# Patient Record
Sex: MALE | Race: WHITE
[De-identification: names, ages, dates, MRNs, and addresses within clinical notes are randomized per-mention and may not be internally consistent; named-entity substitution may affect disease eponyms.]

---

## 2018-04-11 ENCOUNTER — HOSPITAL ENCOUNTER (INPATIENT)
Dept: HOSPITAL 68 - ERH | Age: 83
LOS: 5 days | DRG: 812 | End: 2018-04-16
Admitting: INTERNAL MEDICINE
Payer: COMMERCIAL

## 2018-04-11 VITALS — HEIGHT: 71 IN | WEIGHT: 166 LBS | BODY MASS INDEX: 23.24 KG/M2

## 2018-04-11 DIAGNOSIS — F03.90: ICD-10-CM

## 2018-04-11 DIAGNOSIS — E11.9: ICD-10-CM

## 2018-04-11 DIAGNOSIS — I95.1: ICD-10-CM

## 2018-04-11 DIAGNOSIS — I48.91: ICD-10-CM

## 2018-04-11 DIAGNOSIS — Z85.46: ICD-10-CM

## 2018-04-11 DIAGNOSIS — I45.10: ICD-10-CM

## 2018-04-11 DIAGNOSIS — I10: ICD-10-CM

## 2018-04-11 DIAGNOSIS — E78.5: ICD-10-CM

## 2018-04-11 DIAGNOSIS — N17.9: ICD-10-CM

## 2018-04-11 DIAGNOSIS — Z66: ICD-10-CM

## 2018-04-11 DIAGNOSIS — I47.2: ICD-10-CM

## 2018-04-11 DIAGNOSIS — D64.9: Primary | ICD-10-CM

## 2018-04-11 DIAGNOSIS — E44.1: ICD-10-CM

## 2018-04-11 DIAGNOSIS — N28.1: ICD-10-CM

## 2018-04-11 LAB
ABSOLUTE GRANULOCYTE CT: 6.5 /CUMM (ref 1.4–6.5)
ABSOLUTE GRANULOCYTE CT: 7.9 /CUMM (ref 1.4–6.5)
BASOPHILS # BLD: 0.1 /CUMM (ref 0–0.2)
BASOPHILS # BLD: 0.1 /CUMM (ref 0–0.2)
BASOPHILS NFR BLD: 0.6 % (ref 0–2)
BASOPHILS NFR BLD: 0.7 % (ref 0–2)
EOSINOPHIL # BLD: 0.1 /CUMM (ref 0–0.7)
EOSINOPHIL # BLD: 0.1 /CUMM (ref 0–0.7)
EOSINOPHIL NFR BLD: 0.5 % (ref 0–5)
EOSINOPHIL NFR BLD: 0.6 % (ref 0–5)
ERYTHROCYTE [DISTWIDTH] IN BLOOD BY AUTOMATED COUNT: 23.1 % (ref 11.5–14.5)
ERYTHROCYTE [DISTWIDTH] IN BLOOD BY AUTOMATED COUNT: 23.4 % (ref 11.5–14.5)
GRANULOCYTES NFR BLD: 66.4 % (ref 42.2–75.2)
GRANULOCYTES NFR BLD: 66.7 % (ref 42.2–75.2)
HCT VFR BLD CALC: 23.9 % (ref 42–52)
HCT VFR BLD CALC: 25.1 % (ref 42–52)
LYMPHOCYTES # BLD: 2.5 /CUMM (ref 1.2–3.4)
LYMPHOCYTES # BLD: 3.2 /CUMM (ref 1.2–3.4)
MCH RBC QN AUTO: 30.6 PG (ref 27–31)
MCH RBC QN AUTO: 31 PG (ref 27–31)
MCHC RBC AUTO-ENTMCNC: 32.2 G/DL (ref 33–37)
MCHC RBC AUTO-ENTMCNC: 32.9 G/DL (ref 33–37)
MCV RBC AUTO: 94.2 FL (ref 80–94)
MCV RBC AUTO: 94.9 FL (ref 80–94)
MONOCYTES # BLD: 0.7 /CUMM (ref 0.1–0.6)
MONOCYTES # BLD: 0.7 /CUMM (ref 0.1–0.6)
PLATELET # BLD: 458 /CUMM (ref 130–400)
PLATELET # BLD: 470 /CUMM (ref 130–400)
PMV BLD AUTO: 9 FL (ref 7.4–10.4)
PMV BLD AUTO: 9.3 FL (ref 7.4–10.4)
RED BLOOD CELL CT: 2.52 /CUMM (ref 4.7–6.1)
RED BLOOD CELL CT: 2.67 /CUMM (ref 4.7–6.1)
WBC # BLD AUTO: 11.9 /CUMM (ref 4.8–10.8)
WBC # BLD AUTO: 9.7 /CUMM (ref 4.8–10.8)

## 2018-04-11 NOTE — ED GENERAL ADULT
History of Present Illness
 
General
Chief Complaint: General Adult
Stated Complaint: BIBA ABNORMAL LABS
Source: family, EMS
Exam Limitations: confusion, dementia, poor historian
 
Vital Signs & Intake/Output
Vital Signs & Intake/Output
 Vital Signs
 
 
Date Time Temp Pulse Resp B/P B/P Pulse O2 O2 Flow FiO2
 
     Mean Ox Delivery Rate 
 
04/11 2210 97.9 78 16 122/65  98 Room Air  
 
04/11 1856 97.2 73 16 119/58  99 Room Air  
 
04/11 1532 96.2 61 18 106/56  100 Room Air  
 
 
 ED Intake and Output
 
 
 04/12 0000 04/11 1200
 
Intake Total  
 
Output Total  
 
Balance  
 
   
 
Patient 177 lb 
 
Weight  
 
Weight Reported by Patient 
 
Measurement  
 
Method  
 
 
 
Allergies
Coded Allergies:
NO KNOWN ALLERGIES (01/29/16)
 
Triage Note:
PT BROUGHT TO ED BY AMBULANCE FROM [facility]
 FACILITY, DEMENTIA UNIT FOR H/H 6.8/21.8. PT
 OFFERS NO COMPLAINTS
Triage Nurses Notes Reviewed? yes
Onset: Abrupt
Duration: day(s):
Timing: recent history
HPI:
 
 
 
4/11/18
83-year-old man brought into the emergency department for agitation and abnormal
labs.  According to the family and I spoke both to the wife and the daughter, 
the patient has a history of dementia.  He is usually reasonably controlled and 
cheerful on his meds.  He had outpatient lab work that revealed anemia.  They 
also noted that he has not been taking his medications and has been agitated.  
In the ED he is threatening.  He states that he wants to die.  He is oriented 
3.  He refused IV fluids, rectal exam or further evaluation.  IM Haldol was 
ordered.
(Parker Singleton DO)
Reconcile Medications
Amlodipine Besylate 10 MG TABLET   1 TAB PO DAILY HEART  (Reported)
Aspirin (Ecotrin*) 81 MG TABLET.DR   1 TAB PO DAILY HEART  (Reported)
Cholecalciferol (Vitamin D3) (Vitamin D) 1,000 UNIT TABLET   1 TAB PO DAILY SUP 
(Reported)
Citalopram Hydrobromide (Celexa) 10 MG TABLET   0.5 TAB PO DAILY MENTLA  (
Reported)
Cyanocobalamin (Vitamin B-12) 1,000 MCG TABLET   1 TAB PO DAILY SUP  (Reported)
Divalproex Sodium (Depakote) 125 MG TABLET.DR   1 TAB PO TID MOOD  (Reported)
Donepezil HCl 10 MG TABLET   1 TAB PO DAILY DEMENTIA  (Reported)
Ramelteon (Rozerem) 8 MG TABLET   1 TAB PO AT BEDTIME PRN INSONIA
Simvastatin (Zocor*) 40 MG TABLET   1 TAB PO QPM hlp  (Reported)
Trazodone HCl 50 MG TABLET   0.5 TAB PO Q8 PRN AGITATION/AGGRESSIVE BEHAVIOIU
 
(Barbie TOSCANO,Pablo BELTRAN)
 
Past History
 
Travel History
Traveled to [place] past 21 day No
 
Medical History
Any Pertinent Medical History? see below for history
Neurological: dementia
EENT: NONE
Cardiovascular: hypertension, HYPERLIPIDEMIA
Respiratory: NONE
Gastrointestinal: NONE
Hepatic: NONE
Renal: NONE
Musculoskeletal: NONE
Psychiatric: NONE
Endocrine: NIDDM
Blood Disorders: NONE, anemia
Cancer(s): NONE
GYN/Reproductive: NONE
History of MRSA: No
History of VRE: No
History of CDIFF: No
 
Surgical History
Surgical History: non-contributory
 
Psychosocial History
Who do you live with Family
Services at Home Home Health Aide
What is your primary language English
Tobacco Use: Never used
Daily Tobacco Use Amount/Type: =< 4 Cigarettes daily
ETOH Use: denies use
Illicit Drug Use: denies illicit drug use
 
Family History
Family History, If Any:
Relation not specified for:
  *No pertinent family history
 
Hx Contributory? No
(Parker Singleton DO)
 
Review of Systems
 
Review of Systems
Constitutional:
Denies: fever. 
EENTM:
Reports: no symptoms. 
Respiratory:
Denies: short of breath. 
Cardiovascular:
Denies: chest pain. 
GI:
Denies: abdominal pain. 
Genitourinary:
Reports: no symptoms. 
Musculoskeletal:
Reports: no symptoms. 
Skin:
Reports: no symptoms. 
Neurological/Psychological:
Reports: confusion, dementia. 
Hematologic/Endocrine:
Reports: no symptoms. 
Immunologic/Allergic:
Reports: no symptoms. 
(Parker Singleton DO)
 
Physical Exam
 
Physical Exam
General Appearance: awake, anxious, moderate distress
Head: atraumatic, normal appearance
Eyes:
Bilateral: normal appearance, PERRL, EOMI. 
Ears, Nose, Throat: normal pharynx, normal ENT inspection
Neck: normal inspection, supple
Respiratory: normal breath sounds, chest non-tender, no respiratory distress
Cardiovascular: regular rate/rhythm
Peripheral Pulses:
4+ radial (R), 4+ radial (L)
Gastrointestinal: non-tender
Back: normal inspection
Extremities: no edema
Neurologic/Psych: awake, alert, oriented x 3
Skin: intact, normal color, warm/dry
 
Core Measures
ACS in differential dx? No
CVA/TIA Diagnosis: No
Sepsis Present: No
Sepsis Focused Exam Completed? No
(Mani ARRIAGA,Parker MARSHALL)
 
Physical Exam
Rectal: guaiac negative
(Barbie TOSCANO,Pablo BELTRAN)
 
Progress
Differential Diagnoses
I considered the following diagnoses in my evaluation of the patient: [Dementia,
agitation, GI bleed, UTI, electrolyte derangement, suicidal ideation, depression
]
 
Plan of Care:
 Orders
 
 
Procedure Date/time Status
 
Heart Healthy Diet 04/12 B Active
 
MISTAKE 04/12 0600 Active
 
PARTIAL THROMBOPLASTIN TIME 04/12 0600 Active
 
PROTHROMBIN TIME 04/12 0600 Active
 
CBC WITHOUT DIFFERENTIAL 04/12 0600 Active
 
BASIC ELECTROLYTES PLUS BUN&CR 04/12 0600 Active
 
Lab Add-on Test 04/12  UNK Active
 
Hemoccult 04/12  UNK Active
 
EKG 04/11 2339 Active
 
EKG 04/11 2310 Active
 
Lab Add-on Test 04/11 2302 Active
 
Pathway - chart 04/11 2300 Active
 
House Staff 04/11 2300 Active
 
Patient Data 04/11 2246 Active
 
Saline Lock 04/11 2237 Active
 
Misc Message 04/11 2237 Active
 
ED Holding Orders 04/11 2237 Active
 
Admit to inpatient 04/11 2237 Active
 
Vital Signs 04/11 2237 Active
 
Code Status 04/11 2237 Active
 
EKG 04/11 2218 Active
 
RETICULOCYTE COUNT 04/11 2133 Active
 
CBC WITHOUT DIFFERENTIAL 04/11 2130 Active
 
TSH REFLEX 04/11 1629 Active
 
TOTAL IRON BINDING CAPACITY 04/11 1629 Active
 
PRE-ALBUMIN 04/11 1629 Active
 
LDH (LACT ACID DEHYDROGENASE) 04/11 1629 Active
 
FOLIC ACID 04/11 1629 Active
 
FERRITIN 04/11 1629 Active
 
SERUM IRON 04/11 1629 Active
 
VITAMIN B12 04/11 1629 Active
 
Saline Lock 04/11 1617 Active
 
COMPREHENSIVE METABOLIC PANEL 04/11 1617 Active
 
CBC WITHOUT DIFFERENTIAL 04/11 1617 Complete
 
Intake & Output 04/11 1545 Active
 
VTE Mechanical Prophylaxis 04/11  UNK Active
 
 
 Current Medications
 
 
  Sig/Tanesha Start time  Last
 
Medication Dose  Stop Time Status Admin
 
Atorvastatin Calcium 20 MG 1700 04/12 1700 UNVr 
 
(Lipitor)     
 
Divalproex Sodium 125 MG TID 04/12 1000 AC 
 
(Depakote)     
 
Cholecalciferol 1,000 IU 0900 04/12 0900 AC 
 
(Vitamin D)     
 
Cyanocobalamin 1,000 MCG 0900 04/12 0900 AC 
 
(Vitamin B12)     
 
Donepezil HCl 10 MG 0900 04/12 0900 AC 
 
(Aricept)     
 
Ramelteon 8 MG AT BEDTIME PRN 04/12 0145 UNVr 
 
(Rozerem)     
 
Trazodone HCl 25 MG Q8 PRN 04/11 2345 AC 
 
(Desyrel)     
 
Acetaminophen 650 MG Q6P PRN 04/11 2300 AC 
 
(Tylenol)     
 
 
 Laboratory Tests
 
 
 
04/11/18 2133:
CBC w Diff NO MAN DIFF REQ, RBC 2.52  L, MCV 94.9  H, MCH 30.6, MCHC 32.2  L, 
RDW 23.4  H, MPV 9.0, Gran % 66.7, Lymphocytes % 25.5, Monocytes % 6.7, 
Eosinophils % 0.5, Basophils % 0.6, Absolute Granulocytes 6.5, Absolute 
Lymphocytes 2.5, Absolute Monocytes 0.7  H, Absolute Eosinophils 0.1, Absolute 
Basophils 0.1, Retic Count Pending
 
04/11/18 1629:
Anion Gap 14, Estimated GFR 53  L, BUN/Creatinine Ratio 22.3, Glucose 115  H, 
Calcium 9.1, Iron 54, TIBC 357, Ferritin 223.0, Total Bilirubin 0.4, AST 16  L, 
ALT 20  L, Alkaline Phosphatase 64, Lactate Dehydrogenase 431, Total Protein 6.0
 L, Albumin 3.6, Globulin 2.4, Albumin/Globulin Ratio 1.5, Prealbumin Pending, 
Vitamin B12 > 1000  H, Folate > 20.0  H, TSH &T3 &Free T4 Intrp Pending, CBC w 
Diff NO MAN DIFF REQ, RBC 2.67  L, MCV 94.2  H, MCH 31.0, MCHC 32.9  L, RDW 23.1
 H, MPV 9.3, Gran % 66.4, Lymphocytes % 26.5, Monocytes % 5.8, Eosinophils % 0.6
, Basophils % 0.7, Absolute Granulocytes 7.9  H, Absolute Lymphocytes 3.2, 
Absolute Monocytes 0.7  H, Absolute Eosinophils 0.1, Absolute Basophils 0.1
 
Initial ED EKG: none
(Parker Singleton DO)
Differential Diagnoses
I considered the following diagnoses in my evaluation of the patient: anemia, gi
bleed vs other. 
 
Initial ED EKG: rbbb, afibx2
(Barbie TOSCANO,Pablo BELTRAN)
 
Departure
 
Departure
Disposition: STILL A PATIENT
Condition: Stable
Clinical Impression
Primary Impression: Agitation
Secondary Impressions: Anemia, Azotemia, Suicidal ideation
Referrals:
Saima Mixon MD
 
Departure Forms:
Customer Survey
General Discharge Information
Comments
 
 
 
 
 
4/11/18
The patient was signed out to Dr. Valentin at 7 PM.
(Parker Singleton DO)
 
Departure
Prescriptions:
Current Visit Scripts
Ramelteon (Rozerem) 1 TAB PO AT BEDTIME PRN INSONIA 
     #2 TAB 
 
 
Comments
4/11/18, 20:03... pt is guiac negative on exam, otherwise benign.... Labs from 
[facility] reveal hgb 6.8.  Here, hgb is 8.5... will repeat.  will check orthostatics. 
if stable, pt safe for discharge for outpatient workup. 
 
Admission Note
Spoke With:
Lance Brady MD
Documentation of Exam:
Documentation of any treatments & extenuating circumstances including Concerns 
Regarding Discharge (functional status, medication knowledge or non-compliance, 
living conditions, etc.) that warrant an admission rather than observation: 
 
hgb 8.3 -->7.7.... was 6.8 at [facility]... pt merits serial cbc, consider endocscopy 
if cbc continues to fall. 
 
(Barbie TOSCANO,Pablo BELTRAN)
 
Critical Care Note
 
Critical Care Note
Critical Care Time: 30-74 min
(Parker Singleton DO)

## 2018-04-11 NOTE — HISTORY & PHYSICAL
Arvin Beckwith 04/11/18 2253:
General Information and HPI
History of Present Illness:
Mr. Noguera is a 82 yo m with a PMH HTN, HLD, dementia SIB [facility] for low H&H.  Patient poor historian.  According to [facility] facility, 3 weeks ago patient had a viral gastroenteritis that eventually
resolved.  Subsequently patient had a poor appetite and has not been eating his 
favorite foods when offered to him.  According to the nurse patient has lost 
approximately 20 pounds in months which prompted the PCP to order routine labs 
which revealed changes his low H&H.  According to patient at baseline he walks 
without assistance.  Patient has not had any nausea, vomiting, fever, chills, 
night sweats, abdominal pain or urinary symptoms.
 
Allergies/Medications
Allergies:
Coded Allergies:
NO KNOWN ALLERGIES (01/29/16)
 
 
Past History
 
Travel History
Traveled to [place] past 21 day No
 
Medical History
Neurological: dementia
EENT: NONE
Cardiovascular: hypertension, HYPERLIPIDEMIA
Respiratory: NONE
Gastrointestinal: NONE
Hepatic: NONE
Renal: NONE
Musculoskeletal: NONE
Psychiatric: NONE
Endocrine: NIDDM
Blood Disorders: NONE, anemia
Cancer(s): NONE
GYN/Reproductive: NONE
History of MRSA: No
History of VRE: No
History of CDIFF: No
 
Surgical History
Surgical History: non-contributory
 
Past Family/Social History
 
Family History
Relations & Conditions if any
Relation not specified for:
  *No pertinent family history
 
 
Psychosocial History
Who Do You Live With? spouse
Services at Home: Home Health Aide
Primary Language: English
ETOH Use: denies use
Illicit Drug Use: denies illicit drug use
 
Functional Ability
ADLs
Independent: dressing, eating, toileting, bathing. 
Ambulation: independent
IADLs
Independent: shopping, housework, finances, food prep, telephone, transportation
, medication admin. 
 
Review of Systems
 
Review of Systems
Constitutional:
Reports: see HPI. 
 
Exam & Diagnostic Data
Last 24 Hrs of Vital Signs/I&O
 Vital Signs
 
 
Date Time Temp Pulse Resp B/P B/P Pulse O2 O2 Flow FiO2
 
     Mean Ox Delivery Rate 
 
04/11 2210 97.9 78 16 122/65  98 Room Air  
 
04/11 1856 97.2 73 16 119/58  99 Room Air  
 
04/11 1532 96.2 61 18 106/56  100 Room Air  
 
 
 Intake & Output
 
 
 04/12 0800 04/12 0000 04/11 1600
 
Intake Total   
 
Output Total   
 
Balance   
 
    
 
Patient   177 lb
 
Weight   
 
Weight   Reported by Patient
 
Measurement   
 
Method   
 
 
 
 
Physical Exam
General Appearance Alert, Cooperative, No Acute Distress, Oriented to person and
place
Cardiovascular Regular Rate, Normal S1, Normal S2, No Murmurs
Lungs Clear to Auscultation, Normal Air Movement
Abdomen Normal Bowel Sounds, Soft, No Tenderness
Extremities No Edema
Last 24 Hrs of Labs/Jesse:
 Laboratory Tests
 
04/11/18 2133:
CBC w Diff NO MAN DIFF REQ, RBC 2.52  L, MCV 94.9  H, MCH 30.6, MCHC 32.2  L, 
RDW 23.4  H, MPV 9.0, Gran % 66.7, Lymphocytes % 25.5, Monocytes % 6.7, 
Eosinophils % 0.5, Basophils % 0.6, Absolute Granulocytes 6.5, Absolute 
Lymphocytes 2.5, Absolute Monocytes 0.7  H, Absolute Eosinophils 0.1, Absolute 
Basophils 0.1
 
04/11/18 1629:
Anion Gap 14, Estimated GFR 53  L, BUN/Creatinine Ratio 22.3, Glucose 115  H, 
Calcium 9.1, Iron 54, TIBC 357, Ferritin 223.0, Total Bilirubin 0.4, AST 16  L, 
ALT 20  L, Alkaline Phosphatase 64, Lactate Dehydrogenase 431, Total Protein 6.0
 L, Albumin 3.6, Globulin 2.4, Albumin/Globulin Ratio 1.5, Vitamin B12 Pending, 
Folate Pending, CBC w Diff NO MAN DIFF REQ, RBC 2.67  L, MCV 94.2  H, MCH 31.0, 
MCHC 32.9  L, RDW 23.1  H, MPV 9.3, Gran % 66.4, Lymphocytes % 26.5, Monocytes %
5.8, Eosinophils % 0.6, Basophils % 0.7, Absolute Granulocytes 7.9  H, Absolute 
Lymphocytes 3.2, Absolute Monocytes 0.7  H, Absolute Eosinophils 0.1, Absolute 
Basophils 0.1
 
 
Diagnostic Data
EKG Results
RBBB, HR 69, QTc 502
 
Assessment/Plan
Assessment:
Mr. Noguera is a 82 yo m with a PMH HTN, HLD, dementia SIB [facility] by [facility] for low H&H with recent weight loss.  His cardiologist is Dr. Wang Graf.  In the ED he was found to be orthostatic positive
 
Problem list:
Anemia
SCOTT
? New onset AFIB
 
Plan:
Admit to general med for further evaluation and management
Monitor H&H, goal hgb >7, consider transfusion if necessary
Anemia workup: iron studies, folate, B12, retic ct
Consider gentle hydration if creatinine function worsens
Continue home meds except antihypertensives given his current blood pressure and
citalopram due to QTc prolongation
Repeat orthostats
Consider GI consult if signs of bleeding
Cardio consult for ? afib
DVT ppx: ALPS
 
Code: DNR/I
 
As Ranked By This Provider
Problem List:
 1. Anemia
 
 2. SCOTT (acute kidney injury)
 
 
Core Measures/Misc (9/17)
 
Acute Coronary Syndrome
ACS Diagnosis: No
 
Congestive Heart Failure
Congestive Heart Failure Diagnosis No
 
Cerebrovascular Accident
CVA/TIA Diagnosis: No
 
VTE (View Protocol)
VTE Risk Factors Age>40
No Mechanical VTE Prophylaxis d/t N/A MechProphylax Ordered
No VTE Pharm Prophylaxis d/t Medical Contraindication
 
Sepsis (View protocol)
Sepsis Present: No
 
Daniel Zimmerman 04/12/18 0147:
General Information and HPI
 
Allergies/Medications
Home Med list
Amlodipine Besylate 10 MG TABLET   1 TAB PO DAILY HEART  (Reported)
Aspirin (Ecotrin*) 81 MG TABLET.DR   1 TAB PO DAILY HEART  (Reported)
Cholecalciferol (Vitamin D3) (Vitamin D) 1,000 UNIT TABLET   1 TAB PO DAILY SUP 
(Reported)
Citalopram Hydrobromide (Celexa) 10 MG TABLET   0.5 TAB PO DAILY MENTLA  (
Reported)
Cyanocobalamin (Vitamin B-12) 1,000 MCG TABLET   1 TAB PO DAILY SUP  (Reported)
Divalproex Sodium (Depakote) 125 MG TABLET.DR   1 TAB PO TID MOOD  (Reported)
Donepezil HCl 10 MG TABLET   1 TAB PO DAILY DEMENTIA  (Reported)
Ramelteon (Rozerem) 8 MG TABLET   1 TAB PO AT BEDTIME PRN INSONIA
Simvastatin (Zocor*) 40 MG TABLET   1 TAB PO QPM hlp  (Reported)
Trazodone HCl 50 MG TABLET   0.5 TAB PO Q8 PRN AGITATION/AGGRESSIVE BEHAVIOIU
 
 
 
Resident Review Statement
Resident Statement: examined this patient, discussed with intern, reviewed 
images, amended to note
Other Findings:
82-year-old gentleman who has history of dementia, hypertension, organic 
affective disorder, CKD,HLP
Who has been living at dementia unit at [facility] in senior housing was brought 
to the ED because of abnormal labs especially low hemoglobin.
 
Patient has dementia however he is oriented to place and time but not the dates 
and season.  He denies any chief complaint of chest pain, nausea, vomiting, 
fevers, chills, weakness, changes any urinary or bowel habits.  He also denies 
that he is taking any medication or has any past medical history.
Information was obtained from patient's power of , his wife Brooke and 
the nurse at the facility.
Apparently patient had episode of gastroenteritis 2 weeks ago and after that he 
had very low appetite.  The wife also reports that patient has lost 15 pounds 
with unknown duration of time And has fatigue as well.  Patient PCP Dr. Hardwick 
ordered some blood work showed hemoglobin of 6.7+ he was referred to ED for 
further management.
 
Last hemoglobin in October 16 was 13.3.  Patient's blood work was done twice in 
the ED with hemoglobin of 8.3 an patient had positive orthostatic vital signs in
the ED as well.
 
Vital signs in ED upon visiting was stable, no fever
 
Patient is alert and oriented 2
 
Skin No Rashes, No Breakdown, No Significant Lesion
HEENT Atraumatic, PERRL,
Cardiovascular Regular Rate, Normal S1, Normal S2
Lungs Clear to Auscultation, Normal Air Movement
Abdomen Normal Bowel Sounds, Soft, No Tenderness, No Hepatospenomegaly, No 
Masses
Extremities bilateral ankle edema
 
 platelet count 458, hemoglobin 7.7, creatinine 1.3 which is the baseline,low 
ast alt, hco3 21
 
EKG showed possible atrial fibrillation in some leads however wwe will repeat 
the EKG, no acute ST-T changes, RBBB, Qtc 500
 
Stool guaiac was negative
 
Assessment
Macrocytic anemia
Orthostatic hypotension, 
history of dementia on valproic acid and Aricept, 
history of organic behavior disorder
History of hyperlipidemia
History of hypertension
 
Plan
Admit to general medicine floor
Check CBC in the morning
Patient can eat for now
Hold amlodipine for now and check orthostatic vital signs in the morning
Check B12, folate, iron studies,LDH,retic count (outpatient labs retic count was
very low)
GI consultation in the morning if hg drops further
Hold aspirin, no subcutaneous heparin for pharmacological DVT prophylaxis
Repeat EKG again to rule out atrial fibrillation
Hold Celexa(prologned qtc)
c/w trazodone, valproic acid,aricept
Watch for bleeding
Continue Zocor
watch for sundowning
Stool guaiacs
 
 
DNR/DNI, regular diet, Tylenol for pain, DVT prophylaxis is mechanical
 
 
Lance Brady 04/12/18 0539:
Attending MD Review Statement
 
Attending Statement
Attending MD Statement: examined this patient, discuss w/resident/PA/NP, agreed 
w/resident/PA/NP, reviewed EMR data (avail), reviewed images, amended to note
Attending Assessment/Plan:
 
CC: Low hemoglobin
PMH: Hypertension, dyslipidemia, diabetes mellitus, left lower extremity 
weakness, prostate cancer, dementia
 
Patient does not provide any history, because of dementia. History is mostly 
obtained from calling ECF and patient's family. According to them Asian had 
gastroenteritis approximately 3 weeks back, since then his appetite has been low
and he lost 20 pounds weight in last 1 month so routine labs were obtained where
his hemoglobin was found to be low which is 6.6 so he was sent to ER. Patient 
does not endorse any complaints, not much oriented. Family not bedside.
 
Vitals: Afebrile, pulse 61, RR 18, blood pressure 106/56, saturating 100% on 
room air 
On exam: A NOT ORIENTED, cooperative, no acute distress, neck supple, JVD normal
, no lymphadenopathy, mucosa moist, no focal neurological deficit, no dependent 
edema, no obvious skin rashes or inflammation CVS: S1-S2, RRR. RS: Clear to 
auscultate bilaterally. Abdomen: Soft, NT, ND, bowel sounds present.
 
Assessment and plan
 
83 year old male with above-mentioned past medical history was sent to ER from 
[facility] for low H&H. It was routinely checked for his decreased appetite and 20 
pound weight loss over last 1 month. Nursing home has been giving different food
to him according to his liking but patient has not been eating much. No obvious 
blood loss noted. Rectal examination done in ER was guaiac negative but 
orthostatic vitals were checked and lying down and sitting position his blood 
pressure dropped. ER suggested admission for low H&H. Even though his hemoglobin
was 6.6 at the facility repeat hemoglobin was 8.3 when he came to ER. Repeated 5
hours after first ER draw, hemoglobin 7.7. It's unclear significance, no acute 
bleeding noted. Probably anemia appears nutritional. Change in orthostatics 
could be autonomic dysfunction related to his dementia.
ECG shows questionable A. fib versus artifact, no history of A. fib in the past,
will place him on telemetry and obtain cardiology opinion
 
+ Anemia
+ ? new onset afib 
+ History of Hypertension, dyslipidemia, diabetes mellitus, left lower extremity
weakness, prostate cancer, dementia
 
 
- Admit to telemetry 
- Check iron, TIBC, ferritin, reticulocyte count, folic acid, B12, prealbumin
- Nutritional consult in a.m.
- Patient may require outpatient GI follow-up
- Repeat CBC BMP in a.m.
- No DVT prophylaxis
- Continue all his home medications except antihypertensives
- Cardiology consult in a.m.

## 2018-04-12 VITALS — SYSTOLIC BLOOD PRESSURE: 112 MMHG | DIASTOLIC BLOOD PRESSURE: 59 MMHG

## 2018-04-12 VITALS — DIASTOLIC BLOOD PRESSURE: 56 MMHG | SYSTOLIC BLOOD PRESSURE: 100 MMHG

## 2018-04-12 VITALS — DIASTOLIC BLOOD PRESSURE: 54 MMHG | SYSTOLIC BLOOD PRESSURE: 98 MMHG

## 2018-04-12 LAB
ABSOLUTE GRANULOCYTE CT: 4.5 /CUMM (ref 1.4–6.5)
ABSOLUTE GRANULOCYTE CT: 5.9 /CUMM (ref 1.4–6.5)
APTT BLD: 27 SEC (ref 25–37)
BASOPHILS # BLD: 0 /CUMM (ref 0–0.2)
BASOPHILS # BLD: 0.1 /CUMM (ref 0–0.2)
BASOPHILS NFR BLD: 0.5 % (ref 0–2)
BASOPHILS NFR BLD: 0.6 % (ref 0–2)
EOSINOPHIL # BLD: 0 /CUMM (ref 0–0.7)
EOSINOPHIL # BLD: 0.1 /CUMM (ref 0–0.7)
EOSINOPHIL NFR BLD: 0.5 % (ref 0–5)
EOSINOPHIL NFR BLD: 1.3 % (ref 0–5)
ERYTHROCYTE [DISTWIDTH] IN BLOOD BY AUTOMATED COUNT: 21.8 % (ref 11.5–14.5)
ERYTHROCYTE [DISTWIDTH] IN BLOOD BY AUTOMATED COUNT: 23.6 % (ref 11.5–14.5)
GRANULOCYTES NFR BLD: 58.5 % (ref 42.2–75.2)
GRANULOCYTES NFR BLD: 64.8 % (ref 42.2–75.2)
HCT VFR BLD CALC: 22.9 % (ref 42–52)
HCT VFR BLD CALC: 24.3 % (ref 42–52)
LYMPHOCYTES # BLD: 2.5 /CUMM (ref 1.2–3.4)
LYMPHOCYTES # BLD: 2.5 /CUMM (ref 1.2–3.4)
MCH RBC QN AUTO: 30.3 PG (ref 27–31)
MCH RBC QN AUTO: 30.4 PG (ref 27–31)
MCHC RBC AUTO-ENTMCNC: 32 G/DL (ref 33–37)
MCHC RBC AUTO-ENTMCNC: 32.2 G/DL (ref 33–37)
MCV RBC AUTO: 94 FL (ref 80–94)
MCV RBC AUTO: 94.9 FL (ref 80–94)
MONOCYTES # BLD: 0.5 /CUMM (ref 0.1–0.6)
MONOCYTES # BLD: 0.6 /CUMM (ref 0.1–0.6)
PLATELET # BLD: 425 /CUMM (ref 130–400)
PLATELET # BLD: 442 /CUMM (ref 130–400)
PMV BLD AUTO: 10 FL (ref 7.4–10.4)
PMV BLD AUTO: 10 FL (ref 7.4–10.4)
PROTHROMBIN TIME: 10.8 SEC (ref 9.4–12.5)
RED BLOOD CELL CT: 2.43 /CUMM (ref 4.7–6.1)
RED BLOOD CELL CT: 2.56 /CUMM (ref 4.7–6.1)
WBC # BLD AUTO: 7.7 /CUMM (ref 4.8–10.8)
WBC # BLD AUTO: 9.1 /CUMM (ref 4.8–10.8)

## 2018-04-12 NOTE — ADMISSION CERTIFICATION
Admission Certification
 
Certification Statement
- As attending physician, I certify that at the time of
- admission, based on clinical presentation, severity of
- symptoms, need for further diagnostic testing and
- therapeutic interventions, and risk of adverse outcomes
- without in-hospital treatment, in my clinical assessment,
- this patient requires an acute hospital stay for a minimum
- of two nights or longer. I have also considered psychsocial
- factors such as support system, advanced age, financial
- issues, cognitive issues, and failed out-patient treatments,
- past re-admission history, safety of patient, and lack of
- compliance as applicable.
Specific rationale supporting this admission is:
Anemia

## 2018-04-12 NOTE — CONS- CARDIOLOGY
General Information and HPI
 
Consulting Request
Date of Consult: 04/12/18
Requested By:
Lance Brady MD
 
Reason for Consult:
Anemia/ Atrial Fib
Source of Information: patient, old records
Exam Limitations: patient's age, dementia, poor historian
History of Present Illness:
History of Present Illness:
Mr. Noguera is a 82 yo m with a PMH HTN, HLD, dementia SIB Benchmark by [facility] for low H&H.  Patient poor historian.  According to [facility], 3 weeks ago patient had a viral gastroenteritis that eventually
resolved.  Subsequently patient had a poor appetite and has not been eating his 
favorite foods when offered to him.  According to the nurse patient has lost 
approximately 20 pounds in months which prompted the PCP to order routine labs 
which revealed changes his low H&H.  According to patient at baseline he walks 
without assistance.  Patient has not had any nausea, vomiting, fever, chills, 
night sweats, abdominal pain or urinary symptoms.
 
 
Allergies/Medications
Allergies:
Coded Allergies:
NO KNOWN ALLERGIES (01/29/16)
 
Home Med List:
Amlodipine Besylate 10 MG TABLET   1 TAB PO DAILY HEART  (Reported)
Aspirin (Ecotrin*) 81 MG TABLET.DR   1 TAB PO DAILY HEART  (Reported)
Cholecalciferol (Vitamin D3) (Vitamin D) 1,000 UNIT TABLET   1 TAB PO DAILY SUP 
(Reported)
Citalopram Hydrobromide (Celexa) 10 MG TABLET   0.5 TAB PO DAILY MENTLA  (
Reported)
Cyanocobalamin (Vitamin B-12) 1,000 MCG TABLET   1 TAB PO DAILY SUP  (Reported)
Divalproex Sodium (Depakote) 125 MG TABLET.DR   1 TAB PO TID MOOD  (Reported)
Donepezil HCl 10 MG TABLET   1 TAB PO DAILY DEMENTIA  (Reported)
Ramelteon (Rozerem) 8 MG TABLET   1 TAB PO AT BEDTIME PRN INSONIA
Simvastatin (Zocor*) 40 MG TABLET   1 TAB PO QPM hlp  (Reported)
Trazodone HCl 50 MG TABLET   0.5 TAB PO Q8 PRN AGITATION/AGGRESSIVE BEHAVIOIU
 
Current Medications:
 Current Medications
 
 
  Sig/Tanesha Start time  Last
 
Medication Dose Route Stop Time Status Admin
 
Acetaminophen 650 MG Q6P PRN 04/11 2300 AC 
 
  PO   
 
Atorvastatin Calcium 20 MG 1700 04/12 1700 AC 
 
  PO   
 
Cholecalciferol 1,000 IU 0900 04/12 0900 AC 04/12
 
  PO   0957
 
Cyanocobalamin 1,000 MCG 0900 04/12 0900 AC 04/12
 
  PO   0957
 
Divalproex Sodium 125 MG TID 04/12 1000 DC 
 
  PO   
 
Divalproex Sodium 125 MG TID 04/12 0900 AC 04/12
 
  PO   0957
 
Donepezil HCl 10 MG 0900 04/12 0900 AC 04/12
 
  PO   0957
 
Haloperidol 0 .STK-MED ONE 04/11 1901 DC 
 
  .ROUTE   
 
Haloperidol 5 MG ONCE ONE 04/11 1900 DC 04/11
 
  IM 04/11 1901  1858
 
Ramelteon 8 MG AT BEDTIME PRN 04/12 0145 AC 
 
  PO   
 
Trazodone HCl 25 MG Q8 PRN 04/11 2345 AC 
 
  PO   
 
 
 
 
Past History
 
Travel History
Traveled to [place] past 21 day No
 
Medical History
Neurological: dementia
EENT: NONE
Cardiovascular: hypertension, HYPERLIPIDEMIA
Respiratory: NONE
Gastrointestinal: NONE
Hepatic: NONE
Renal: NONE
Musculoskeletal: NONE
Psychiatric: NONE
Endocrine: NIDDM
Blood Disorders: NONE, anemia
Cancer(s): NONE
GYN/Reproductive: NONE
 
Surgical History
Surgical History: non-contributory
 
Family History
Relations & Conditions If Any:
Relation not specified for:
  *No pertinent family history
 
 
Psychosocial History
Who Do You Live With? spouse
Services at Home: Home Health Aide
Primary Language: English
ETOH Use: denies use
Illicit Drug Use: denies illicit drug use
 
Functional Ability
ADLs
Independent: dressing, eating, toileting, bathing. 
Ambulation: independent
IADLs
Independent: shopping, housework, finances, food prep, telephone, transportation
, medication admin. 
 
ECHO Results (as available)
Date of last Echo 05/02/16
EF% 67
Report:
Normal left and right ventricular systolic function. Normal left atrial 
size.PASP 30 mm Hg. No significant valvular abnormalities.
 
Exam & Diagnostic Data
Vital Signs and I&O
Vital Signs
 
 
Date Time Temp Pulse Resp B/P B/P Pulse O2 O2 Flow FiO2
 
     Mean Ox Delivery Rate 
 
04/12 0723 97.8 54 20 95/53  98 Room Air  
 
04/12 0552 95.7 54 20 124/56  99 Room Air  
 
04/11 2210 97.9 78 16 122/65  98 Room Air  
 
04/11 1856 97.2 73 16 119/58  99 Room Air  
 
04/11 1532 96.2 61 18 106/56  100 Room Air  
 
 
 Intake & Output
 
 
 04/12 1600 04/12 0800 04/12 0000 04/11 1600 04/11 0800 04/11 0000
 
Intake Total      
 
Output Total      
 
Balance      
 
       
 
Patient    177 lb  
 
Weight      
 
Weight    Reported by Patient  
 
Measurement      
 
Method      
 
 
 
Physical Exam:
On physical exam he appeared comfortable.
Head normocephalic atraumatic
Eyes sclera anicteric conjunctiva showed pallor extraocular muscles were normal
Neck no jugular venous distention no thyroid masses no palpable nodes
Chest lungs were clear bilaterally
Heart irregular rhythm with a ventricular rate around 70 no audible murmurs 
heard
Abdomen soft no organomegaly bowel sounds normal
Extremities no clubbing cyanosis or edema
Neurological no gross motor or sensory deficits
Labs/Jesse Results:
 Laboratory Tests
 
 
 04/12 04/11
 
 0601 2133
 
Chemistry  
 
  Sodium (137 - 145 mmol/L) 142 
 
  Potassium (3.5 - 5.1 mmol/L) 4.2 
 
  Chloride (98 - 107 mmol/L) 109  H 
 
  Carbon Dioxide (22 - 30 mmol/L) 22 
 
  Anion Gap (5 - 16) 11 
 
  BUN (9 - 20 mg/dL) 24  H 
 
  Creatinine (0.7 - 1.2 mg/dL) 1.2 
 
  Estimated GFR (>60 ml/min) 58  L 
 
  BUN/Creatinine Ratio (7 - 25 %) 20.0 
 
Coagulation  
 
  PT (9.4 - 12.5 SEC) 10.8 
 
  INR (0.90 - 1.17) 0.99 
 
  APTT (25 - 37 SEC) 27 
 
Hematology  
 
  CBC w Diff NO MAN DIFF REQ NO MAN DIFF REQ
 
  WBC (4.8 - 10.8 /CUMM) 7.7 9.7
 
  RBC (4.70 - 6.10 /CUMM) 2.43  L 2.52  L
 
  Hgb (14.0 - 18.0 G/DL) 7.4 *L 7.7  L
 
  Hct (42 - 52 %) 22.9  L 23.9  L
 
  MCV (80.0 - 94.0 FL) 94.0 94.9  H
 
  MCH (27.0 - 31.0 PG) 30.3 30.6
 
  MCHC (33.0 - 37.0 G/DL) 32.2  L 32.2  L
 
  RDW (11.5 - 14.5 %) 21.8  H 23.4  H
 
  Plt Count (130 - 400 /CUMM) 425  H 458  H
 
  MPV (7.4 - 10.4 FL) 10.0 9.0
 
  Gran % (42.2 - 75.2 %) 58.5 66.7
 
  Lymphocytes % (20.5 - 51.1 %) 33.0 25.5
 
  Monocytes % (1.7 - 9.3 %) 6.7 6.7
 
  Eosinophils % (0 - 5 %) 1.3 0.5
 
  Basophils % (0.0 - 2.0 %) 0.5 0.6
 
  Absolute Granulocytes (1.4 - 6.5 /CUMM) 4.5 6.5
 
  Absolute Lymphocytes (1.2 - 3.4 /CUMM) 2.5 2.5
 
  Absolute Monocytes (0.10 - 0.60 /CUMM) 0.5 0.7  H
 
  Absolute Eosinophils (0.0 - 0.7 /CUMM) 0.1 0.1
 
  Absolute Basophils (0.0 - 0.2 /CUMM) 0 0.1
 
  Retic Count (0.5 - 2.0 %)  9.52  H
 
 
 
 
 04/11
 
 1629
 
Chemistry 
 
  Sodium (137 - 145 mmol/L) 140
 
  Potassium (3.5 - 5.1 mmol/L) 4.3
 
  Chloride (98 - 107 mmol/L) 105
 
  Carbon Dioxide (22 - 30 mmol/L) 21  L
 
  Anion Gap (5 - 16) 14
 
  BUN (9 - 20 mg/dL) 29  H
 
  Creatinine (0.7 - 1.2 mg/dL) 1.3  H
 
  Estimated GFR (>60 ml/min) 53  L
 
  BUN/Creatinine Ratio (7 - 25 %) 22.3
 
  Glucose (65 - 99 mg/dL) 115  H
 
  Calcium (8.4 - 10.2 mg/dL) 9.1
 
  Iron (49 - 181 ug/dL) 54
 
  TIBC (261 - 462 ug/dL) 357
 
  Ferritin (17.9 - 464 ng/mL) 223.0
 
  Total Bilirubin (0.2 - 1.3 mg/dL) 0.4
 
  AST (17 - 59 U/L) 16  L
 
  ALT (21 - 72 U/L) 20  L
 
  Alkaline Phosphatase (< 127 U/L) 64
 
  Lactate Dehydrogenase (313 - 618 U/L) 431
 
  Total Protein (6.3 - 8.2 g/dL) 6.0  L
 
  Albumin (3.5 - 5.0 g/dL) 3.6
 
  Globulin (1.9 - 4.2 gm/dL) 2.4
 
  Albumin/Globulin Ratio (1.1 - 2.2 %) 1.5
 
  Prealbumin (17.6 - 36.0 mg/dL) 24.6
 
  Vitamin B12 (239 - 931 pg/mL) > 1000  H
 
  Folate (2.76 - 20.0 ng/mL) > 20.0  H
 
  TSH &T3 &Free T4 Intrp (0.27 - 4.20 uIU/mL) 2.070
 
Hematology 
 
  CBC w Diff NO MAN DIFF REQ
 
  WBC (4.8 - 10.8 /CUMM) 11.9  H
 
  RBC (4.70 - 6.10 /CUMM) 2.67  L
 
  Hgb (14.0 - 18.0 G/DL) 8.3  L
 
  Hct (42 - 52 %) 25.1  L
 
  MCV (80.0 - 94.0 FL) 94.2  H
 
  MCH (27.0 - 31.0 PG) 31.0
 
  MCHC (33.0 - 37.0 G/DL) 32.9  L
 
  RDW (11.5 - 14.5 %) 23.1  H
 
  Plt Count (130 - 400 /CUMM) 470  H
 
  MPV (7.4 - 10.4 FL) 9.3
 
  Gran % (42.2 - 75.2 %) 66.4
 
  Lymphocytes % (20.5 - 51.1 %) 26.5
 
  Monocytes % (1.7 - 9.3 %) 5.8
 
  Eosinophils % (0 - 5 %) 0.6
 
  Basophils % (0.0 - 2.0 %) 0.7
 
  Absolute Granulocytes (1.4 - 6.5 /CUMM) 7.9  H
 
  Absolute Lymphocytes (1.2 - 3.4 /CUMM) 3.2
 
  Absolute Monocytes (0.10 - 0.60 /CUMM) 0.7  H
 
  Absolute Eosinophils (0.0 - 0.7 /CUMM) 0.1
 
  Absolute Basophils (0.0 - 0.2 /CUMM) 0.1
 
 
 
 
Diagnostic Data
EKG Results
EKG atrial fibrillation with slow ventricular response of around 64 right bundle
branch block
CXR Results
Not done
 
Assessment/Plan
Assessment/Plan
In summary this 83-year-old gentleman was admitted with marked anemia of unknown
origin
He has not seen her since 2015.  At that time he had seen us for follow-up of 
hypertension, he is a known diabetic.  2012 nuclear stress test showed a fixed 
inferior abnormality probably artifact.  He was noted in the emergency room to 
have atrial fibrillation and indeed an EKG confirms that he had atrial 
fibrillation with slow ventricular response and right bundle branch block.
 
Therefore he has a following problems
1.  Anemia unknown source workup in progress
2.  Atrial fibrillation of unknown duration with slow ventricular response.  He 
obviously does not need rate lowering medications for his atrial fibrillation.  
I would hold off on anticoagulation because of workup for anemia and until 
occult GI bleed is excluded.
3.  Hypertension
4.  Diabetes mellitus
5.  Dementia
 
 
Consult Acknowledgment
- Thank you for your consult request.

## 2018-04-12 NOTE — PN- HOUSESTAFF
Rosita Sanchez 18 0932:
Subjective
Follow-up For:
Anemia
? New onset AFIB
SCOTT
Subjective:
The patient was seen and examined. He offers no complaints.  He denies any 
dizziness, lightheadedness, chest pain, shortness of breath, nausea, vomiting, 
abdominal pain.
 
Patient's wife and daughter mentioned that he has been having decreased by mouth
intake recently and has no appetite.
 
Decreased in hemoglobin from 7.7 to 7.4.  Vital signs stable.
 
Patient's wife would like to discuss with the patient and other family members 
regarding the consent for blood transfusion. 
 
Review of Systems
Constitutional:
Denies: see HPI. 
 
Objective
Last 24 Hrs of Vital Signs/I&O
 Vital Signs
 
 
Date Time Temp Pulse Resp B/P B/P Pulse O2 O2 Flow FiO2
 
     Mean Ox Delivery Rate 
 
 1436 98.2 56 22 112/59  96 Room Air  
 
 1137  55 16 102/58  98 Room Air  
 
 0723 97.8 54 20 95/53  98 Room Air  
 
 0552 95.7 54 20 124/56  99 Room Air  
 
 2210 97.9 78 16 122/65  98 Room Air  
 
 1856 97.2 73 16 119/58  99 Room Air  
 
 1532 96.2 61 18 106/56  100 Room Air  
 
 
 Intake & Output
 
 
  1600  0800  0000
 
Intake Total   
 
Output Total   
 
Balance   
 
    
 
Patient 177 lb  
 
Weight   
 
Weight Reported by Patient  
 
Measurement   
 
Method   
 
 
 
 
Physical Exam
General Appearance: Alert, Cooperative, No Acute Distress
Skin: No Rashes
HEENT: Atraumatic, PERRLA, EOMI, Mucous Membr. moist/pink
Neck: Supple
Cardiovascular: No Murmurs, Irregular
Lungs: Clear to Auscultation, Normal Air Movement
Abdomen: Normal Bowel Sounds
Neurological: Normal Speech, Normal Tone
Extremities: No Clubbing, No Cyanosis, No Edema, Normal Pulses, No Tenderness/
Swelling
Vascular: Normal Pulses, Pulses Symmetrical
Current Medications:
 Current Medications
 
 
  Sig/Tanesha Start time  Last
 
Medication Dose Route Stop Time Status Admin
 
Acetaminophen 650 MG Q6P PRN  2300 AC 
 
  PO   
 
Atorvastatin Calcium 20 MG 1700  1700 AC 
 
  PO   
 
Cholecalciferol 1,000 IU  0900 AC 
 
  PO   0957
 
Cyanocobalamin 1,000 MCG  09 AC 
 
  PO   0957
 
Divalproex Sodium 125 MG TID  1000 DC 
 
  PO   
 
Divalproex Sodium 125 MG TID  0900 AC 
 
  PO   1448
 
Donepezil HCl 10 MG 0900  0900 AC 
 
  PO   0957
 
Haloperidol 0 .STK-MED ONE 1901 DC 
 
  .ROUTE   
 
Haloperidol 5 MG ONCE ONE 1900 DC 
 
  IM 1901  185
 
Ramelteon 8 MG AT BEDTIME PRN  0145 AC 
 
  PO   
 
Trazodone HCl 25 MG Q8 PRN  2345 AC 
 
  PO   
 
 
 
 
Last 24 Hrs of Lab/Jesse Results
Last 24 Hrs of Labs/Mics:
 Laboratory Tests
 
18 0601:
Anion Gap 11, Estimated GFR 58  L, BUN/Creatinine Ratio 20.0, PT 10.8, INR 0.99,
APTT 27, CBC w Diff NO MAN DIFF REQ, RBC 2.43  L, MCV 94.0, MCH 30.3, MCHC 32.2 
L, RDW 21.8  H, MPV 10.0, Gran % 58.5, Lymphocytes % 33.0, Monocytes % 6.7, 
Eosinophils % 1.3, Basophils % 0.5, Absolute Granulocytes 4.5, Absolute 
Lymphocytes 2.5, Absolute Monocytes 0.5, Absolute Eosinophils 0.1, Absolute 
Basophils 0
 
18 2133:
CBC w Diff NO MAN DIFF REQ, RBC 2.52  L, MCV 94.9  H, MCH 30.6, MCHC 32.2  L, 
RDW 23.4  H, MPV 9.0, Gran % 66.7, Lymphocytes % 25.5, Monocytes % 6.7, 
Eosinophils % 0.5, Basophils % 0.6, Absolute Granulocytes 6.5, Absolute 
Lymphocytes 2.5, Absolute Monocytes 0.7  H, Absolute Eosinophils 0.1, Absolute 
Basophils 0.1, Retic Count 9.52  H
 
18 1629:
Anion Gap 14, Estimated GFR 53  L, BUN/Creatinine Ratio 22.3, Glucose 115  H, 
Calcium 9.1, Iron 54, TIBC 357, Ferritin 223.0, Total Bilirubin 0.4, AST 16  L, 
ALT 20  L, Alkaline Phosphatase 64, Lactate Dehydrogenase 431, Total Protein 6.0
 L, Albumin 3.6, Globulin 2.4, Albumin/Globulin Ratio 1.5, Prealbumin 24.6, 
Vitamin B12 > 1000  H, Folate > 20.0  H, TSH &T3 &Free T4 Intrp 2.070, CBC w 
Diff NO MAN DIFF REQ, RBC 2.67  L, MCV 94.2  H, MCH 31.0, MCHC 32.9  L, RDW 23.1
 H, MPV 9.3, Gran % 66.4, Lymphocytes % 26.5, Monocytes % 5.8, Eosinophils % 0.6
, Basophils % 0.7, Absolute Granulocytes 7.9  H, Absolute Lymphocytes 3.2, 
Absolute Monocytes 0.7  H, Absolute Eosinophils 0.1, Absolute Basophils 0.1
 
 
Assessment/Plan
Assessment:
Mr. Noguera is a 82 yo m with a PMH HTN, HLD, dementia SIB Benchmark by [facility] for low H&H with recent weight loss.  His cardiologist is Dr. Wang Graf.  In the ED he was found to be orthostatic positive.
 
Problem list:
* Anemia
* SCOTT-resolved
* New onset AFIB
 
Plan:
 
* Monitor H&H, goal hgb >7, consider transfusion if necessary
* Anemia workup: iron studies wnl, folate-B12 elevated, retic ct; elevated 
likely 2/2 rapid production due to recent blood loss vs hemolytic anemia
* repeat retic also check  lactate dehydrogenase (LDH),  haptoglobin, 
unconjugated bilirubin.
* Consider hem consult in the morning
* Continue home meds except antihypertensives given his current blood pressure 
and citalopram due to QTc prolongation
* Repeat orthostats
* Consider GI consult if signs of bleeding
* Cardio consult appreciated did not recommend AC
* DVT ppx: ALPS
Problem List:
 1. SOCTT (acute kidney injury)
 
 2. Anemia
 
Pain Ratin
Pain Location:
NA
Pain Goal: Remain pain free
Pain Plan:
NA
Tomorrow's Labs & Rationales:
CBC to monitor H&H 
BEP to monitor electrolytes
 
 
Zainab Peng 18 1342:
Attending MD Review Statement
 
Attending Statement
Attending MD Statement: examined this patient, discuss w/resident/PA/NP, agreed 
w/resident/PA/NP, discussed with family, reviewed EMR data (avail), discussed 
with nursing, discussed with case mgmt, reviewed images, amended to note
Attending Assessment/Plan:
Assessment and plan
 
82 yo m with a PMH HTN, HLD, dementia SIB Benchmark by [facility] for low H&H.  Patient poor historian. H/o weight loss+, loss of 
appetite+. Increased RDW on labs 
 
1. Anemia microcytic picture
2. new onset afib 
3. History of Hypertension, dyslipidemia, diabetes mellitus, left lower 
extremity weakness, prostate cancer, 
4. Advanced/progressivbe dementia with loss of appetite and weight loss. 
 
 
- f/u iron, TIBC, ferritin, reticulocyte count, folic acid, B12, prealbumin
- Nutritional consult in a.m.
- Patient may require outpatient GI follow-up
- No DVT prophylaxis
- Continue all his home medications except antihypertensives
- Cardiology consulted , rate controlled, no a/c for now in view of anemia. 
 
Patient/family refused colonoscopy for now, wish conservative measures, await 
blood transfusion consent. NO PEG in future and DNR/DNI.

## 2018-04-13 VITALS — SYSTOLIC BLOOD PRESSURE: 102 MMHG | DIASTOLIC BLOOD PRESSURE: 58 MMHG

## 2018-04-13 VITALS — DIASTOLIC BLOOD PRESSURE: 62 MMHG | SYSTOLIC BLOOD PRESSURE: 112 MMHG

## 2018-04-13 LAB
ABSOLUTE GRANULOCYTE CT: 5.5 /CUMM (ref 1.4–6.5)
BASOPHILS # BLD: 0 /CUMM (ref 0–0.2)
BASOPHILS NFR BLD: 0.5 % (ref 0–2)
EOSINOPHIL # BLD: 0.1 /CUMM (ref 0–0.7)
EOSINOPHIL NFR BLD: 1.4 % (ref 0–5)
ERYTHROCYTE [DISTWIDTH] IN BLOOD BY AUTOMATED COUNT: 22.1 % (ref 11.5–14.5)
GRANULOCYTES NFR BLD: 59.5 % (ref 42.2–75.2)
HCT VFR BLD CALC: 25 % (ref 42–52)
LYMPHOCYTES # BLD: 2.9 /CUMM (ref 1.2–3.4)
MCH RBC QN AUTO: 30.3 PG (ref 27–31)
MCHC RBC AUTO-ENTMCNC: 31.8 G/DL (ref 33–37)
MCV RBC AUTO: 95.3 FL (ref 80–94)
MONOCYTES # BLD: 0.7 /CUMM (ref 0.1–0.6)
PLATELET # BLD: 435 /CUMM (ref 130–400)
PMV BLD AUTO: 10.1 FL (ref 7.4–10.4)
RED BLOOD CELL CT: 2.62 /CUMM (ref 4.7–6.1)
WBC # BLD AUTO: 9.2 /CUMM (ref 4.8–10.8)

## 2018-04-13 NOTE — CT SCAN REPORT
EXAMINATION:
CT CHEST, ABDOMEN AND PELVIS WITHOUT CONTRAST
 
CLINICAL INFORMATION:
83-year-old male presented with weight loss and anemia. Suspected malignancy.
 
COMPARISON:
CT of the pelvis done on 01/30/2016.
 
TECHNIQUE:
Multidetector volumetric imaging was performed from the thoracic inlet
through the pubic symphysis without administration of any oral and
intravenous contrast. Sagittal and coronal reformatted images were obtained
on the technologist's workstation.
 
DLP:
632.5 mGy-cm
 
FINDINGS:
 
CHEST:
Lungs: Emphysematous disease is noted predominately involving both upper
lobes of the lung. Mild bronchiectatic changes are noted at both lung bases.
Nonspecific cystic changes are noted at right lower lobe of the lung. There
is no discrete lung nodule and/or mass identified. The tracheobronchial tree
appears patent.
 
Mediastinum: Atherosclerotic disease is noted within the aorta and its
branches. The ascending thoracic aorta measures 3.9 cm at its maximum
anteroposterior dimension at the level of the main pulmonary artery,
borderline aneurysmal.
 
Pericardium/Pleura: There is no significant effusion. No pleural mass or
thickening.
 
Chest Wall/Axilla: Unremarkable.
 
ABDOMEN/PELVIS:
Evaluation is slightly technically limited due to lack of intravenous
contrast.
 
Liver, Gallbladder, Biliary Tree: The liver is normal in size, shape, and
attenuation. No focal hepatic lesion or biliary ductal dilatation is present.
The gallbladder is unremarkable with no evidence of radiopaque gallstones,
gallbladder wall thickening, or pericholecystic inflammatory changes.
 
Pancreas: Unremarkable.
 
Spleen: Unremarkable.
 
Adrenal Glands: Unremarkable.
 
Kidneys and Ureters: There is an exophytic 2.3 cm maximum dimension cortical
hypodensity present with Hounsfield value of 10, consistent with cortical
renal cyst seen at mid posterior left renal cortex. Otherwise both kidneys
are unremarkable.
 
Bladder: A small amount of intraluminal air is identified within the
nondependent part of the urinary bladder, may represent changes secondary to
recent urinary bladder intervention. Alternatively, may represent fistula
formation or infection with gas-forming organism.
 
Gastrointestinal Tract: Colonic diverticulosis-related changes are noted
within the large bowel without any CT features of superimposed acute
diverticulitis. The small bowel loops are decompressed. The appendix is not
visualized. Significant fecal residual is noted within the rectum.
 
Abdominal Wall: No hernia is demonstrated.
 
Lymph Nodes: No pathologically enlarged retroperitoneal, mesenteric, pelvic
and/or groin, inguinal lymphadenopathy identified.
 
Vascular: Diffuse atherosclerotic disease is noted within the aorta and its
branches. No evidence of any aneurysm formation seen.
 
Pelvic Viscera: Radiation seeds are identified within the prostate. There is
no periprostatic lymphadenopathy, mass or free fluid or free air identified.
 
Osseous Structures: Postsurgical changes of anterior lower cervical spine
fusion is noted. Multilevel degenerative spondylosis-related changes are
noted at mid to lower thoracic and throughout the entire lumbar spine.
Significant facet joint arthritic changes are noted at lower lumbar spine.
 
IMPRESSION:
1. No acute intrathoracic and/or intra-abdominal and/or intrapelvic pathology
is present on this nonenhanced study.
2. Radiation seeds are noted within the prostate.
3. A small amount of air pocket is identified within the urinary bladder, may
represent changes secondary to recent intervention or urinary bladder
infection with gas-forming organism versus fistula formation.
4. Emphysematous disease within the lungs predominately involving both upper
lobes with nonspecific cystic changes at right lower lobe of the lung.
5. Borderline ascending thoracic aortic aneurysm measuring 3.9 cm at its
maximum dimension at the level of the main pulmonary artery.
6. Exophytic 2.3 cm left renal cortical cyst.
7. Colonic diverticulosis without any CT features of superimposed acute
diverticulitis.

## 2018-04-13 NOTE — DISCHARGE SUMMARY
**See Addendum**
Visit Information
 
Visit Dates
Admission Date:
04/11/18
 
Discharge Date:
4/16/18
 
Hospital Course
 
Course
Attending Physician:
Zainab Peng MD
 
Primary Care Physician:
Ronen TOSCANO,Ye Cash
 
Consulting Request:
   Consulting Specialty: Cardiology
Hospital Course:
Mr. Noguera is a 82 yo m with a PMH HTN, HLD, dementia SIB Benchmark by [facility] for low H&H with recent weight loss.  His cardiologist is Dr. Wang Cary.  In the ED he was found to be orthostatic positive.
 
Vital signs on admission: 
 
 Vital Signs
 
 
Date Time Temp Pulse Resp B/P B/P Pulse O2 O2 Flow FiO2
 
     Mean Ox Delivery Rate 
 
04/11 2210 97.9 78 16 122/65  98 Room Air  
 
04/11 1856 97.2 73 16 119/58  99 Room Air  
 
04/11 1532 96.2 61 18 106/56  100 Room Air  
 
 
Physical exam at the time of admission: 
General Appearance Alert, Cooperative, No Acute Distress, Oriented to person and
place
Cardiovascular Regular Rate, Normal S1, Normal S2, No Murmurs
Lungs Clear to Auscultation, Normal Air Movement
Abdomen Normal Bowel Sounds, Soft, No Tenderness
Extremities No Edema
 
Pertinent Labs and diagnostic data: 
 Laboratory Tests
 
04/11/18 2133:
CBC w Diff NO MAN DIFF REQ, RBC 2.52  L, MCV 94.9  H, MCH 30.6, MCHC 32.2  L, 
RDW 23.4  H, MPV 9.0, Gran % 66.7, Lymphocytes % 25.5, Monocytes % 6.7, 
Eosinophils % 0.5, Basophils % 0.6, Absolute Granulocytes 6.5, Absolute 
Lymphocytes 2.5, Absolute Monocytes 0.7  H, Absolute Eosinophils 0.1, Absolute 
Basophils 0.1
 
04/11/18 1629:
Anion Gap 14, Estimated GFR 53  L, BUN/Creatinine Ratio 22.3, Glucose 115  H, 
Calcium 9.1, Iron 54, TIBC 357, Ferritin 223.0, Total Bilirubin 0.4, AST 16  L, 
ALT 20  L, Alkaline Phosphatase 64, Lactate Dehydrogenase 431, Total Protein 6.0
 L, Albumin 3.6, Globulin 2.4, Albumin/Globulin Ratio 1.5, Vitamin B12 Pending, 
Folate Pending, CBC w Diff NO MAN DIFF REQ, RBC 2.67  L, MCV 94.2  H, MCH 31.0, 
MCHC 32.9  L, RDW 23.1  H, MPV 9.3, Gran % 66.4, Lymphocytes % 26.5, Monocytes %
5.8, Eosinophils % 0.6, Basophils % 0.7, Absolute Granulocytes 7.9  H, Absolute 
Lymphocytes 3.2, Absolute Monocytes 0.7  H, Absolute Eosinophils 0.1, Absolute 
Basophils 0.1
 
 
Diagnostic Data
EKG:ECG questionable A. fib versus artifact
 
The patient was admitted to telemetry floor for further evaluation. H&H has 
remained stable. Cardiology was consulted who did not recommend any 
anticoagulation given patient's dementia, anemia and plan for conservative 
approach. He converted back to sinus rhythm. Family prefers a more conservative 
approach, refusing colonoscopy. Initially noted to be in SCOTT which resolved 
after IV fluid therapy. 
 
Anemia workup revealed: Iron studies wnl, folate-B12 elevated, retic ct; 
elevated likely 2/2 rapid production due to recent blood loss. Repeat retic 
elevated,LDH within normal limits, haptoglobin pending, unconjugated bilirubin 
within normal limits. He may benefit from outpatient hematology evaluation. His 
aspirin was resumed upon discharge. 
 
Underwent CT chest, abdomen and pelvis to r/o any possible malignancy given 
anemia, low appetite and wt loss, refusing colonoscopy. Results were negative 
for any acute pathology. May benefit from GI evaluation as an outpatient. 
Further evaluation of CT findings such as left renal cortical cyst to be done as
an outpatient. 
 
Nutrition consult was placed for mild protein/calorie malnutrition. Glucerna BID
was added to his meals. His diet was changed to regular. He will need biweekly 
weights.
 
Amlodipone was held on admission given his borderline low blood pressure and 
citalopram held due to QTc prolongation. Will need further evaluation as an 
outpatient regarding when to restart. 
 
The patient was noted to have runs of vtach and with paroxysms with aberrant 
conduction on 4/14/18-4/15/18. He did not have any complaints and his vital 
signs were stable. He was evaluated by cardiologist who cleared him for 
discharge. Blood pressure unlikely to tolerate addition of beta-blocker.  
Further cardiac evaluation can be done as an outpatient; should follow up with 
cardiology within 1 week of discharge.
 
DVT PPX: ALPS
 
DNI/DNR.
Allergies:
Coded Allergies:
NO KNOWN ALLERGIES (01/29/16)
 
Significant Procedures:
EXAM TYPE: CAT - CT ABD & PELVIS W/O IV CONTRAS; CT CHEST WO IV CONTRAST
 
EXAMINATION:
CT CHEST, ABDOMEN AND PELVIS WITHOUT CONTRAST
 
CLINICAL INFORMATION:
83-year-old male presented with weight loss and anemia. Suspected malignancy.
 
COMPARISON:
CT of the pelvis done on 01/30/2016.
 
TECHNIQUE:
Multidetector volumetric imaging was performed from the thoracic inlet
through the pubic symphysis without administration of any oral and
intravenous contrast. Sagittal and coronal reformatted images were obtained
on the technologist's workstation.
 
DLP:
632.5 mGy-cm
 
FINDINGS:
 
CHEST:
Lungs: Emphysematous disease is noted predominately involving both upper
lobes of the lung. Mild bronchiectatic changes are noted at both lung bases.
Nonspecific cystic changes are noted at right lower lobe of the lung. There
is no discrete lung nodule and/or mass identified. The tracheobronchial tree
appears patent.
 
Mediastinum: Atherosclerotic disease is noted within the aorta and its
branches. The ascending thoracic aorta measures 3.9 cm at its maximum
anteroposterior dimension at the level of the main pulmonary artery,
borderline aneurysmal.
 
Pericardium/Pleura: There is no significant effusion. No pleural mass or
thickening.
 
Chest Wall/Axilla: Unremarkable.
 
ABDOMEN/PELVIS:
Evaluation is slightly technically limited due to lack of intravenous
contrast.
 
Liver, Gallbladder, Biliary Tree: The liver is normal in size, shape, and
attenuation. No focal hepatic lesion or biliary ductal dilatation is present.
The gallbladder is unremarkable with no evidence of radiopaque gallstones,
gallbladder wall thickening, or pericholecystic inflammatory changes.
 
Pancreas: Unremarkable.
 
Spleen: Unremarkable.
 
Adrenal Glands: Unremarkable.
 
Kidneys and Ureters: There is an exophytic 2.3 cm maximum dimension cortical
hypodensity present with Hounsfield value of 10, consistent with cortical
renal cyst seen at mid posterior left renal cortex. Otherwise both kidneys
are unremarkable.
 
Bladder: A small amount of intraluminal air is identified within the
nondependent part of the urinary bladder, may represent changes secondary to
recent urinary bladder intervention. Alternatively, may represent fistula
formation or infection with gas-forming organism.
 
Gastrointestinal Tract: Colonic diverticulosis-related changes are noted
within the large bowel without any CT features of superimposed acute
diverticulitis. The small bowel loops are decompressed. The appendix is not
visualized. Significant fecal residual is noted within the rectum.
 
Abdominal Wall: No hernia is demonstrated.
 
Lymph Nodes: No pathologically enlarged retroperitoneal, mesenteric, pelvic
and/or groin, inguinal lymphadenopathy identified.
 
Vascular: Diffuse atherosclerotic disease is noted within the aorta and its
branches. No evidence of any aneurysm formation seen.
 
Pelvic Viscera: Radiation seeds are identified within the prostate. There is
no periprostatic lymphadenopathy, mass or free fluid or free air identified.
 
Osseous Structures: Postsurgical changes of anterior lower cervical spine
fusion is noted. Multilevel degenerative spondylosis-related changes are
noted at mid to lower thoracic and throughout the entire lumbar spine.
Significant facet joint arthritic changes are noted at lower lumbar spine.
 
IMPRESSION:
1. No acute intrathoracic and/or intra-abdominal and/or intrapelvic pathology
is present on this nonenhanced study.
2. Radiation seeds are noted within the prostate.
3. A small amount of air pocket is identified within the urinary bladder, may
represent changes secondary to recent intervention or urinary bladder
infection with gas-forming organism versus fistula formation.
4. Emphysematous disease within the lungs predominately involving both upper
lobes with nonspecific cystic changes at right lower lobe of the lung.
5. Borderline ascending thoracic aortic aneurysm measuring 3.9 cm at its
maximum dimension at the level of the main pulmonary artery.
6. Exophytic 2.3 cm left renal cortical cyst.
7. Colonic diverticulosis without any CT features of superimposed acute
diverticulitis.
 
Disposition Summary
 
Disposition
Principal Diagnosis:
Anemia
New onset AFIB
SCOTT
Additional Diagnosis:
Mild protein/calorie malnutrition
Discharge Disposition: SNF
 
Discharge Instructions
 
General Discharge Information
Code Status: Do Not Resucitate/Intubat
Patient's Diet:
Regular diet, Glucerna BID. 
Patient's Activity:
As tolerated. 
Follow-Up Instructions/Appts:
-Please follow up with your PCP within  week of discharge.
-Please follow up with carediologist, Dr. Cary within a week of discharge. 
-Please take your medications as instructed. 
-You will need biweekly weights.
 
Medications at Discharge
Discharge Medications:
Stop taking the following medications:
Amlodipine Besylate (Amlodipine Besylate) 10 MG TABLET ORAL DAILY 
 
Citalopram Hydrobromide (Celexa) 10 MG TABLET ORAL DAILY 
 
Continue taking these medications:
Aspirin (Ecotrin*) 81 MG TABLET.
    1 Tablet ORAL DAILY
 
Donepezil HCl (Donepezil HCl) 10 MG TABLET
    1 Tablet ORAL DAILY
    Qty = 30
    Comments:
       Last Taken:4/15/18
             Time:0900
 
Trazodone HCl (Trazodone HCl) 50 MG TABLET
    0.5 Tablet ORAL EVERY 8 HOURS as needed for AGITATION/AGGRESSIVE BEHAVIOIU
    Qty = 30
 
Cholecalciferol (Vitamin D3) (Vitamin D) 1,000 UNIT TABLET
    1 Tablet ORAL DAILY
    Comments:
       Last Taken:4/15/18
             Time:0900
 
Cyanocobalamin (Vitamin B-12) 1,000 MCG TABLET
    1 Tablet ORAL DAILY
    Comments:
       Last Taken:4/15/18
             Time:0900
 
Divalproex Sodium (Depakote) 125 MG TABLET.DR
    1 Tablet ORAL THREE TIMES DAILY
    Comments:
       Last Taken:4/15/18
             Time:0900
 
Simvastatin (Zocor*) 40 MG TABLET
    1 Tablet ORAL Every night
    Comments:
       Last Taken:4/14/18
             Time:1700
 
Start taking the following new medications:
Nut.tx.gluc.intoler,Lac-Fr,Soy (Glucerna 1.2 Edgard) 237 ML LIQUID
    1 Bottle ORAL TWICE DAILY
    Qty = 30
    No Refills
 
The following medications have been changed:
Old:
Ramelteon (Rozerem) 8 MG TABLET
    1 Tablet ORAL AT BEDTIME
    Days = 30
 
New:
Ramelteon (Rozerem) 8 MG TABLET
    1 Tablet ORAL AT BEDTIME as needed for INSOMNIA
    Qty = 2
 
 
Copies To:
Ronen TOSCANO,Ye Cash

## 2018-04-13 NOTE — PN- HOUSESTAFF
Rosita Sanchez 18 0756:
Subjective
Follow-up For:
Anemia
? New onset AFIB
SCOTT
Tele-Events Since Last Visit:
Atrial flutter.  Rate 57-67, no events.
Subjective:
The patient was seen and examined. He is lying comfortably in bed offers no 
complaints.  He denies any dizziness, lightheadedness, chest pain, shortness of 
breath, nausea, vomiting, abdominal pain.
 
H&H has remained stable.
 
Family prefers a more conservative approach, refusing colonoscopy.
 
He converted back to sinus rhythm.  Cardiology does not recommend any 
anticoagulation.
 
 
Review of Systems
Constitutional:
Reports: no symptoms. 
 
Objective
Last 24 Hrs of Vital Signs/I&O
 Vital Signs
 
 
Date Time Temp Pulse Resp B/P B/P Pulse O2 O2 Flow FiO2
 
     Mean Ox Delivery Rate 
 
 1456 97.6 68 18 112/62  97   
 
 0602 97.2 59 20 102/58  98 Room Air  
 
 2252 97.5 55 20 100/56  97 Room Air  
 
 
 Intake & Output
 
 
  1600  0800  0000
 
Intake Total 500 110 550
 
Output Total   
 
Balance 500 110 550
 
    
 
Intake, IV  10 
 
Intake, Oral 500 100 550
 
Patient 166 lb  166 lb
 
Weight   
 
Weight   Bed scale
 
Measurement   
 
Method   
 
 
 
 
Physical Exam
General Appearance: Alert
Other Physical Findings:
Skin: No Rashes
HEENT: Atraumatic, PERRLA, EOMI, Mucous Membr. moist/pink
Neck: Supple
Cardiovascular: Regular, S1-S2 auscultated, no murmurs
Lungs: Clear to Auscultation, Normal Air Movement
Abdomen: Normal Bowel Sounds
Neurological: Normal Speech, Normal Tone
Extremities: No Clubbing, No Cyanosis, No Edema, Normal Pulses, No Tenderness/
Swelling
Vascular: Normal Pulses, Pulses Symmetrical
Current Medications:
 Current Medications
 
 
  Sig/Tanesha Start time  Last
 
Medication Dose Route Stop Time Status Admin
 
Acetaminophen 650 MG Q6P PRN  2300 AC 
 
  PO   
 
Atorvastatin Calcium 20 MG 1700  1700 AC 
 
  PO   1805
 
Cholecalciferol 1,000 IU  0900 AC 
 
  PO   0831
 
Cyanocobalamin 1,000 MCG  0900 AC 
 
  PO   0830
 
Divalproex Sodium 125 MG TID  09 AC 
 
  PO   1432
 
Donepezil HCl 10 MG 0900 04/12 0900 AC 
 
  PO   0830
 
Ramelteon 8 MG AT BEDTIME PRN  0145 AC 
 
  PO   
 
Trazodone HCl 25 MG Q8 PRN  2345 AC 
 
  PO   
 
 
 
 
Last 24 Hrs of Lab/Jesse Results
Last 24 Hrs of Labs/Mics:
 Laboratory Tests
 
18 0610:
Anion Gap 12, Estimated GFR > 60, BUN/Creatinine Ratio 16.4, CBC w Diff NO MAN 
DIFF REQ, RBC 2.62  L, MCV 95.3  H, MCH 30.3, MCHC 31.8  L, RDW 22.1  H, MPV 
10.1, Gran % 59.5, Lymphocytes % 31.2, Monocytes % 7.4, Eosinophils % 1.4, 
Basophils % 0.5, Absolute Granulocytes 5.5, Absolute Lymphocytes 2.9, Absolute 
Monocytes 0.7  H, Absolute Eosinophils 0.1, Absolute Basophils 0
 
 
Assessment/Plan
Assessment:
Mr. Noguera is a 84 yo m with a PMH HTN, HLD, dementia SIB Benchmark by [facility] for low H&H with recent weight loss.  His cardiologist is Dr. Wang Graf.  In the ED he was found to be orthostatic positive.
 
Problem list:
* Anemia
* SCOTT-resolved
* New onset AFIB
* Mild protein/calorie malnutrition.
 
Plan:
 
* Monitor H&H, goal hgb >7, consider transfusion if necessary
* Anemia workup: iron studies wnl, folate-B12 elevated, retic ct; elevated 
likely 2/2 rapid production due to recent blood loss vs hemolytic anemia
* repeat retic elevated, check  lactate dehydrogenase (LDH),  within normal 
limits, haptoglobin pending, unconjugated bilirubin within normal limits.
* Consider hem consult in the morning
* Continue home meds except antihypertensives given his current blood pressure 
and citalopram due to QTc prolongation
* CT chest, abdomen and pelvis negative for any acute pathology
* Consider GI consult if signs of bleeding
* Cardio consult appreciated did not recommend AC
* Nutrition consult appreciated, will add Glucerna BID, will change to regular 
diet, will need biweekly weights.
* DVT ppx: ALPS
Problem List:
 1. Anemia
 
Pain Ratin
Pain Location:
NA
Pain Goal: Remain pain free
Pain Plan:
NA
Tomorrow's Labs & Rationales:
CBC to monitor H&H
 
 
Zainab Peng 18 1104:
Attending MD Review Statement
 
Attending Statement
Attending MD Statement: examined this patient, discuss w/resident/PA/NP, agreed 
w/resident/PA/NP, discussed with family, reviewed EMR data (avail), discussed 
with nursing, discussed with case mgmt, reviewed images, amended to note
Attending Assessment/Plan:
84 yo m with a PMH HTN, HLD, dementia SIB Benchmark by [facility] for low H&H.  Patient poor historian. No new complaints overnight. 
 
1. Anemia microcytic picture
2. new onset afib 
3. History of Hypertension, dyslipidemia, diabetes mellitus, left lower 
extremity weakness, prostate cancer, 
4. Advanced/progressivbe dementia with loss of appetite and weight loss. 
 
- Obtain Hough ct chest/abd/pelvis for weight loss investigation. 
- Increased retics, LDH 360s.
- DVT prophylaxis with alps.
- Continue all his home medications except antihypertensives
- Cardiology consulted , rate controlled, no a/c as per cards.
- Encourage PO intake
 
Patient/family refused colonoscopy for now, wish conservative measures, NO PEG 
in future and DNR/DNI.

## 2018-04-14 VITALS — SYSTOLIC BLOOD PRESSURE: 102 MMHG | DIASTOLIC BLOOD PRESSURE: 58 MMHG

## 2018-04-14 VITALS — DIASTOLIC BLOOD PRESSURE: 66 MMHG | SYSTOLIC BLOOD PRESSURE: 100 MMHG

## 2018-04-14 VITALS — SYSTOLIC BLOOD PRESSURE: 102 MMHG | DIASTOLIC BLOOD PRESSURE: 54 MMHG

## 2018-04-14 LAB
ABSOLUTE GRANULOCYTE CT: 5 /CUMM (ref 1.4–6.5)
BASOPHILS # BLD: 0.1 /CUMM (ref 0–0.2)
BASOPHILS NFR BLD: 0.8 % (ref 0–2)
EOSINOPHIL # BLD: 0.1 /CUMM (ref 0–0.7)
EOSINOPHIL NFR BLD: 1.4 % (ref 0–5)
ERYTHROCYTE [DISTWIDTH] IN BLOOD BY AUTOMATED COUNT: 21.9 % (ref 11.5–14.5)
GRANULOCYTES NFR BLD: 56.5 % (ref 42.2–75.2)
HCT VFR BLD CALC: 24.9 % (ref 42–52)
LYMPHOCYTES # BLD: 3 /CUMM (ref 1.2–3.4)
MCH RBC QN AUTO: 30.7 PG (ref 27–31)
MCHC RBC AUTO-ENTMCNC: 32.2 G/DL (ref 33–37)
MCV RBC AUTO: 95.4 FL (ref 80–94)
MONOCYTES # BLD: 0.6 /CUMM (ref 0.1–0.6)
PLATELET # BLD: 392 /CUMM (ref 130–400)
PMV BLD AUTO: 9.9 FL (ref 7.4–10.4)
RED BLOOD CELL CT: 2.61 /CUMM (ref 4.7–6.1)
WBC # BLD AUTO: 8.9 /CUMM (ref 4.8–10.8)

## 2018-04-14 NOTE — PATIENT DISCHARGE INSTRUCTIONS
Discharge Instructions
 
General Discharge Information
You were seen/treated for:
Anemia
New onset AFIB
 
Special Instructions:
-Please follow up with your PCP within  week of discharge.
-Please follow up with carediologist, Dr. Cary within a week of discharge. 
-Please take your medications as instructed. 
-You will need biweekly weights.
 
Diet
Continue normal diet: Yes
Additional DIET Information:
Glucerna BID. 
 
Activity
Additional ACTIVITY Info:
As tolerated. 
 
Acute Coronary Syndrome
 
Inclusion Criteria
At DC or during hospital stay patient has or had the following:
 
Discharge Core Measures
Meds if any: Prescribed or Continued at Discharge
Meds if any: NOT Prescribed or Continued at Discharge
 
Congestive Heart Failure
 
Inclusion Criteria
At DC or during hospital stay patient has or had the following:
 
Discharge Core Measures
Meds if any: Prescribed or Continued at Discharge
Meds if any: NOT Prescribed or Continued at Discharge
 
Cerebrovascular accident
 
Inclusion Criteria
At DC or during hospital stay patient has or had the following:
CVA/TIA Diagnosis No
 
Discharge Core Measures
Meds if any: Prescribed or Continued at Discharge
Meds if any: NOT Prescribed or Continued at Discharge
 
Venous thromboembolism
 
Discharge Core Measures
- Per Current guidelines, there needs to be overlap
- treatment for the first 5 days of Warfarin therapy.
- If discharged on Warfarin prior to 5 days of
- overlap therapy, the patient will need to be
- assessed for post discharge needs including
- *Post discharge parental anticoagulation
- *Warfarin and/or parental anticoagulation education
- *Follow up date to check INR post discharge
Meds if any: Prescribed or Continued at Discharge
Note: Overlap Therapy is Warfarin and Anticoagulant
Meds if any: NOT Prescribed or Continued at Discharge

## 2018-04-14 NOTE — EVENT NOTE
Event Note
Event Note:
Pt is having several runs of Vtach (max of 9 beats). This seems to be new for 
him. He is asymptomatic and hemodynamically stable. Will cancel D/C to monitor 
and do further work up.

## 2018-04-15 VITALS — SYSTOLIC BLOOD PRESSURE: 110 MMHG | DIASTOLIC BLOOD PRESSURE: 76 MMHG

## 2018-04-15 VITALS — SYSTOLIC BLOOD PRESSURE: 108 MMHG | DIASTOLIC BLOOD PRESSURE: 50 MMHG

## 2018-04-15 VITALS — DIASTOLIC BLOOD PRESSURE: 70 MMHG | SYSTOLIC BLOOD PRESSURE: 112 MMHG

## 2018-04-15 LAB
ABSOLUTE GRANULOCYTE CT: 5.8 /CUMM (ref 1.4–6.5)
BASOPHILS # BLD: 0 /CUMM (ref 0–0.2)
BASOPHILS NFR BLD: 0.4 % (ref 0–2)
EOSINOPHIL # BLD: 0.1 /CUMM (ref 0–0.7)
EOSINOPHIL NFR BLD: 1.4 % (ref 0–5)
ERYTHROCYTE [DISTWIDTH] IN BLOOD BY AUTOMATED COUNT: 21.6 % (ref 11.5–14.5)
GRANULOCYTES NFR BLD: 60.8 % (ref 42.2–75.2)
HCT VFR BLD CALC: 26.1 % (ref 42–52)
LYMPHOCYTES # BLD: 3 /CUMM (ref 1.2–3.4)
MCH RBC QN AUTO: 30.1 PG (ref 27–31)
MCHC RBC AUTO-ENTMCNC: 31.8 G/DL (ref 33–37)
MCV RBC AUTO: 94.8 FL (ref 80–94)
MONOCYTES # BLD: 0.5 /CUMM (ref 0.1–0.6)
PLATELET # BLD: 357 /CUMM (ref 130–400)
PMV BLD AUTO: 10.4 FL (ref 7.4–10.4)
RED BLOOD CELL CT: 2.75 /CUMM (ref 4.7–6.1)
WBC # BLD AUTO: 9.5 /CUMM (ref 4.8–10.8)

## 2018-04-15 NOTE — PN- ATT ADDEND
Attending Addendum
Attending Brief Note
Mr. Noguera was seen and evaluated.  He was supposed to be d/c yesterday, however 
kept in hospital for telemonitoring.  Denies any CP or SOB today.  Remained HD 
stable
 Vital Signs
 
 
Date Time Temp Pulse Resp B/P B/P Pulse O2 O2 Flow FiO2
 
     Mean Ox Delivery Rate 
 
04/15 0600 98.0 55 20 108/50  94   
 
04/14 2300 97.7 86 20 102/58  95   
 
04/14 1613 98.2 77 20 100/66  97   
 
 
 Intake & Output
 
 
 04/15 1600 04/15 0800 04/15 0000
 
Intake Total   
 
Output Total   
 
Balance   
 
    
 
Number   1
 
Bowel   
 
Movements   
 
Patient   74.503 kg
 
Weight   
 
 
 
A/P:  Mr. Noguera is an 82 yo m with a PMH HTN, HLD, dementia SIB Benchmark by 
[facility] for low H&H.~ Patient poor historian. Anemia 
microcytic picture, new onset afib rate controlled not on anticoagulation as per
cardiology, History of Hypertension, dyslipidemia, diabetes mellitus, left lower
extremity weakness, prostate cancer, Advanced/progressive dementia with loss of 
appetite and weight loss.~Hough ct chest/abd/pelvis for weight loss negative for 
masses.  Patient/family refused colonoscopy and wishes conservative measures, NO
PEG in future and DNR/DNI. 
 
-- Plan for d/c today
 
~

## 2018-04-16 VITALS — SYSTOLIC BLOOD PRESSURE: 100 MMHG | DIASTOLIC BLOOD PRESSURE: 60 MMHG

## 2018-04-16 VITALS — SYSTOLIC BLOOD PRESSURE: 104 MMHG | DIASTOLIC BLOOD PRESSURE: 60 MMHG

## 2018-04-16 LAB
ABSOLUTE GRANULOCYTE CT: 7 /CUMM (ref 1.4–6.5)
BASOPHILS # BLD: 0.1 /CUMM (ref 0–0.2)
BASOPHILS NFR BLD: 0.6 % (ref 0–2)
EOSINOPHIL # BLD: 0.1 /CUMM (ref 0–0.7)
EOSINOPHIL NFR BLD: 0.8 % (ref 0–5)
ERYTHROCYTE [DISTWIDTH] IN BLOOD BY AUTOMATED COUNT: 21.4 % (ref 11.5–14.5)
GRANULOCYTES NFR BLD: 64.1 % (ref 42.2–75.2)
HCT VFR BLD CALC: 26 % (ref 42–52)
LYMPHOCYTES # BLD: 3 /CUMM (ref 1.2–3.4)
MCH RBC QN AUTO: 30.1 PG (ref 27–31)
MCHC RBC AUTO-ENTMCNC: 31.5 G/DL (ref 33–37)
MCV RBC AUTO: 95.7 FL (ref 80–94)
MONOCYTES # BLD: 0.8 /CUMM (ref 0.1–0.6)
PLATELET # BLD: 351 /CUMM (ref 130–400)
PMV BLD AUTO: 10.2 FL (ref 7.4–10.4)
RED BLOOD CELL CT: 2.72 /CUMM (ref 4.7–6.1)
WBC # BLD AUTO: 10.9 /CUMM (ref 4.8–10.8)

## 2018-04-16 NOTE — PN- HOUSESTAFF
Rosita Sanchez 18 0756:
Subjective
Follow-up For:
Anemia
New onset AFIB
SCOTT
Subjective:
The patient was seen and examined. He offers no complaints.  He denies any 
dizziness, lightheadedness, chest pain, shortness of breath, nausea, vomiting, 
abdominal pain.
 
H&H has remained stable.
 
Family prefers a more conservative approach, refusing colonoscopy.
 
He has been having intermitten episodes of paroxysms with aberrant conduction.
 
 
Review of Systems
Constitutional:
Reports: no symptoms. 
 
Objective
Last 24 Hrs of Vital Signs/I&O
 Vital Signs
 
 
Date Time Temp Pulse Resp B/P B/P Pulse O2 O2 Flow FiO2
 
     Mean Ox Delivery Rate 
 
 1401 98.9 85 18 100/60  99 Room Air  
 
 0600 98.6 61 18 104/60  93   
 
04/15 2321 98.9 72 14 110/76  93   
 
 
 Intake & Output
 
 
  1600  0800  0000
 
Intake Total 400  
 
Output Total   
 
Balance 400  
 
    
 
Intake, Oral 400  
 
Patient   166 lb
 
Weight   
 
 
 
 
Physical Exam
General Appearance: Alert, Cooperative, No Acute Distress
Other Physical Findings:
Skin: No Rashes
HEENT: Atraumatic, PERRLA, EOMI, Mucous Membr. moist/pink
Neck: Supple
Cardiovascular: Regular, S1-S2 auscultated, no murmurs
Lungs: Clear to Auscultation, Normal Air Movement
Abdomen: Normal Bowel Sounds
Neurological: Normal Speech, Normal Tone
Extremities: No Clubbing, No Cyanosis, No Edema, Normal Pulses, No Tenderness/
Swelling
Vascular: Normal Pulses, Pulses Symmetrical
Current Medications:
 Current Medications
 
 
  Sig/Tanesha Start time  Last
 
Medication Dose Route Stop Time Status Admin
 
Acetaminophen 650 MG Q6P PRN  2300 DCD 
 
  PO   
 
Atorvastatin Calcium 20 MG 1700  1700 DCD 04/15
 
  PO   1602
 
Cholecalciferol 1,000 IU  0900 DCD 
 
  PO   0800
 
Cyanocobalamin 1,000 MCG  0900 DCD 
 
  PO   0800
 
Divalproex Sodium 125 MG TID  0900 DCD 
 
  PO   1243
 
Donepezil HCl 10 MG  0900 DCD 
 
  PO   0800
 
Ramelteon 8 MG AT BEDTIME PRN  0145 DCD 
 
  PO   
 
Trazodone HCl 25 MG Q8 PRN  2345 DCD 
 
  PO   
 
 
 
 
Last 24 Hrs of Lab/Jesse Results
Last 24 Hrs of Labs/Mics:
 Laboratory Tests
 
18 0615:
Anion Gap 13, Estimated GFR > 60, BUN/Creatinine Ratio 24.5, Magnesium 2.0, CBC 
w Diff NO MAN DIFF REQ, RBC 2.72  L, MCV 95.7  H, MCH 30.1, MCHC 31.5  L, RDW 
21.4  H, MPV 10.2, Gran % 64.1, Lymphocytes % 27.3, Monocytes % 7.2, Eosinophils
% 0.8, Basophils % 0.6, Absolute Granulocytes 7.0  H, Absolute Lymphocytes 3.0, 
Absolute Monocytes 0.8  H, Absolute Eosinophils 0.1, Absolute Basophils 0.1
 
 
Assessment/Plan
Assessment:
Problem list:
* Anemia
* SCOTT-resolved
* New onset AFIB
* Mild protein/calorie malnutrition.
 
Plan:
 
*  H&H stable
* Anemia workup: iron studies wnl, folate-B12 elevated, retic ct; elevated 
likely 2/2 rapid production due to recent blood loss vs hemolytic anemia
* repeat retic elevated, check  lactate dehydrogenase (LDH),  within normal 
limits, haptoglobin pending, unconjugated bilirubin within normal limits.
* Continue home meds except antihypertensives given his current blood pressure 
and citalopram due to QTc prolongation
* CT chest, abdomen and pelvis negative for any acute pathology
* Cardio consult appreciated did not recommend AC
* Nutrition consult appreciated,c/w Glucerna BID, regular diet, will need 
biweekly weights.
* DVT ppx: ALPS
* Cardio did not recommend BB given borderline pressure. 
* Stable for DC
* To follow w/Cardio, CT surgery, PCP in one week. 
Problem List:
 1. Anemia
 
 2. SCOTT (acute kidney injury)
 
Pain Ratin
Pain Location:
NA
Pain Goal: Remain pain free
Pain Plan:
NA
Tomorrow's Labs & Rationales:
NA
Consulting Request:
   Consulting Specialty: Cardiology
 
 
Zainab Peng 18 1152:
Attending MD Review Statement
 
Attending Statement
Attending MD Statement: examined this patient, discuss w/resident/PA/NP, agreed 
w/resident/PA/NP, discussed with family, reviewed EMR data (avail), discussed 
with nursing, discussed with case mgmt, reviewed images, amended to note
Attending Assessment/Plan:
Patient family bedside. Wife is POA. Patient earlier wishes and wife wishes to 
be conservative and avoid anticoagulaiton for now despite knowing risks/
benefits. His CT chest/abd/pelvis demonstrates no masses. Patient has advanced 
dementia with resident of facility. His h/h remained stable over weekend. 
Patient family wishes to bed dischargd back to facility with encourage PO diet. 
DNR/DNI.

## 2018-07-18 ENCOUNTER — HOSPITAL ENCOUNTER (EMERGENCY)
Dept: HOSPITAL 68 - ERH | Age: 83
End: 2018-07-18
Payer: COMMERCIAL

## 2018-07-18 VITALS — WEIGHT: 177 LBS | BODY MASS INDEX: 24.78 KG/M2 | HEIGHT: 71 IN

## 2018-07-18 VITALS — SYSTOLIC BLOOD PRESSURE: 111 MMHG | DIASTOLIC BLOOD PRESSURE: 70 MMHG
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
## 2018-07-18 DIAGNOSIS — S00.81XA: Primary | ICD-10-CM
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
## 2018-07-18 NOTE — CT SCAN REPORT
EXAMINATIONS:
CT HEAD WITHOUT CONTRAST AND CT CERVICAL SPINE WITHOUT CONTRAST
 
CLINICAL INFORMATION:
Trauma to head. Fall. Dementia.
 
COMPARISON:
10/02/2016.
 
TECHNIQUE:
Contiguous helical images of the brain were obtained without IV contrast.
Contiguous helical images of the cervical spine were obtained without IV
contrast. Multiplanar reconstructions were performed.
 
DLP: 962 mGy-cm.
 
FINDINGS:
There are no pathologic extra-axial fluid collections. The lateral, third,
fourth ventricles are mildly prominent, though stable, age-appropriate and
concordant with the appearance of the sulci. There is no evidence for acute
intraparenchymal hemorrhage or infarct. And there is periventricular
low-attenuation indicative of small vessel disease. There is neither mass nor
mass effect. There is no shift of midline structures. The paranasal sinuses
and mastoid air cells are clear. There are no osseous lesions.
 
The cervical vertebra are in normal alignment. There is multilevel disc
height loss within the lower cervical spine. Intact anterior cervical spine
fusion hardware extending from C6 to C7 is identified.. There are no
fractures. There is no prevertebral soft tissue swelling.
 
There is no cervical lymphadenopathy.
 
Manifestations of emphysema are present within the lung apices. The
visualized lung apices are otherwise clear.
 
IMPRESSION:
No evidence for acute intracranial injury. Stable manifestations of cerebral
atrophy. No evidence for acute injury to the cervical spine. Age-appropriate
degenerative change to the cervical spine with intact hardware.
 
Manifestations of emphysema identified within the visualized lung apices.

## 2018-07-18 NOTE — ED MVC/FALL/TRAUMA COMPLAINT
History of Present Illness
 
General
Chief Complaint: Facial or Head Injury
Stated Complaint: BIBA FOR FALL AND HIT HEAD, -LOC -THINNERS
Source: patient, old records
Exam Limitations: dementia
 
Vital Signs & Intake/Output
Vital Signs & Intake/Output
 Vital Signs
 
 
Date Time Temp Pulse Resp B/P B/P Pulse O2 O2 Flow FiO2
 
     Mean Ox Delivery Rate 
 
 1350 98.6 81 18 111/70  96 Room Air  
 
 1346       Room Air  
 
 
 
Allergies
Coded Allergies:
NO KNOWN ALLERGIES (16)
 
Reconcile Medications
Amlodipine Besylate 10 MG TABLET   1 TAB PO DAILY HEART  (Reported)
Aspirin (Ecotrin*) 81 MG TABLET.DR   1 TAB PO DAILY HEART  (Reported)
Cholecalciferol (Vitamin D3) (Vitamin D) 1,000 UNIT TABLET   1 TAB PO DAILY SUP 
(Reported)
Citalopram Hydrobromide (Celexa) 10 MG TABLET   1 TAB PO DAILY MENTAL HEALTH  (
Reported)
Cyanocobalamin (Vitamin B-12) 1,000 MCG TABLET   1 TAB PO DAILY SUP  (Reported)
Divalproex Sodium (Depakote) 125 MG TABLET.DR   1 TAB PO TID MOOD  (Reported)
Donepezil HCl 10 MG TABLET   1 TAB PO DAILY DEMENTIA  (Reported)
Emollient Combination No.69 (Eucerin Skin Calming) 226 GM CREAM..G.   1 CRISPIN TOP 
DAILY PRN DRY SKIN  (Reported)
Folic Acid 1 MG TABLET   1 TAB PO DAILY VITAMIN SUPPORT  (Reported)
Loperamide HCl (Loperamide) 2 MG CAPSULE   1 CAP PO 4 TIMES/DAY NPO PRN GI  (
Reported)
Ramelteon (Rozerem) 8 MG TABLET   1 TAB PO AT BEDTIME PRN INSOMNIA
Simvastatin (Zocor*) 40 MG TABLET   1 TAB PO QPM hlp  (Reported)
Trazodone HCl 50 MG TABLET   0.5 TAB PO Q8 PRN AGITATION/AGGRESSIVE BEHAVIOIU
Trimethoprim 100 MG TABLET   1 TAB PO DAILY UNKNOWN  (Reported)
 
Triage Nurses Notes Reviewed? yes
Onset: Abrupt
Duration: constant
Timing: single episode today
Severity: moderate
Severity Numbers: 5
HPI:
Patient is a 83-year-old male with a past medical history of dementia who is 
brought in by ambulance in which she lives at [facility]  in 
which history is limited due to patient's dementia however the staff state that 
patient has a known history to always tried to ambulate although he is 
wheelchair-bound and which today he tried to stand which he fell out of the 
wheelchair and struck his left side of his forehead to the sidewalk.  No loss 
conscious has occurred staff states that patient has been at his normal baseline
mental status function since injury.
Patient's tetanus is unknown
 
(rGzegorz Almeida)
 
Past History
 
Travel History
Traveled to [place] past 21 day No
 
Medical History
Any Pertinent Medical History? see below for history
Neurological: dementia
EENT: NONE
Cardiovascular: hypertension, HYPERLIPIDEMIA
Respiratory: NONE
Gastrointestinal: NONE
Hepatic: NONE
Renal: NONE
Musculoskeletal: NONE
Psychiatric: NONE
Endocrine: NIDDM
Blood Disorders: NONE, anemia
Cancer(s): NONE
GYN/Reproductive: NONE
History of MRSA: No
History of VRE: No
History of CDIFF: No
 
Surgical History
Surgical History: non-contributory
 
Psychosocial History
Who do you live with Family
[facility]
What is your primary language English
 
Family History
Family History, If Any:
Relation not specified for:
  *No pertinent family history
 
Hx Contributory? No
(Grzegorz Almeida)
 
Review of Systems
 
Review of Systems
Constitutional:
Reports: no symptoms. 
Eyes:
Reports: no symptoms. 
Ears, Nose, Throat, Mouth:
Reports: no symptoms. 
Respiratory:
Reports: no symptoms. 
Cardiovascular:
Reports: no symptoms. 
Gastrointestinal/Abdominal:
Reports: no symptoms. 
Genitourinary:
Reports: no symptoms. 
Musculoskeletal:
Reports: see HPI. 
Skin:
Reports: see HPI. 
Neurological/Psychological:
Reports: no symptoms. 
All Other Systems: Reviewed and Negative
(Grzegorz Almeida)
 
Physical Exam
 
Physical Exam
General Appearance: no apparent distress, alert, comfortable
Head: evidence of injury
Eyes:
Bilateral: normal appearance, PERRL, EOMI. 
Ears, Nose, Throat, Mouth: hearing grossly normal
Neck: normal inspection, no midline tenderness
Respiratory: normal breath sounds, chest non-tender, no respiratory distress
Cardiovascular: regular rate/rhythm
Gastrointestinal: normal bowel sounds, soft, non-tender
Extremities: normal range of motion
Comments:
Bilateral upper extremity and lower extremity full active range of motion 
nontender dermatomes intact
 
Diagram
Head:
 
  1) Noted superficial 2 cm skin abrasion
 
Core Measures
ACS in differential dx? No
CVA/TIA Diagnosis No
Sepsis Present: No
Sepsis Focused Exam Completed? No
(Grzegorz Almeida)
 
Progress
Differential Diagnosis: aoritic dissection, abd injury, C/T/L spine injury, ext 
injury, ICH, pelvis injury, pnemothorax, spinal cord injury
Plan of Care:
 Orders
 
 
Procedure Date/time Status
 
Patient Safety Monitor 1435 Active
 
 
Patient's left forehead facial wound was irrigated with water bacitracin and 
bandage was applied
 
Patient was image from where he was point tender on exam he has no extremity 
point tenderness and full active range of motion
CT scan of head and neck were unremarkable for osseous or acute findings, 
patient will be transferred back to skilled nursing facility
Diagnostic Imaging:
Viewed by Me: CT Scan. 
Radiology Impression: no acute abnormality, no fracture
Comments:
PATIENT: FABRIZIO CM  MEDICAL RECORD NO: [id number]
PRESENT AGE: 83  PATIENT ACCOUNT NO: [id number]
: 34  LOCATION: [facility]
ORDERING PHYSICIAN: Grzegorz ADAMES  
 
  SERVICE DATE: 
EXAM TYPE: CAT - CT CERV SPINE WO IV CONTRAST; CT HEAD WO IV CONTRAST
 
EXAMINATIONS:
CT HEAD WITHOUT CONTRAST AND CT CERVICAL SPINE WITHOUT CONTRAST
 
CLINICAL INFORMATION:
Trauma to head. Fall. Dementia.
 
COMPARISON:
10/02/2016.
 
TECHNIQUE:
Contiguous helical images of the brain were obtained without IV contrast.
Contiguous helical images of the cervical spine were obtained without IV
contrast. Multiplanar reconstructions were performed.
 
DLP: 962 mGy-cm.
 
FINDINGS:
There are no pathologic extra-axial fluid collections. The lateral, third,
fourth ventricles are mildly prominent, though stable, age-appropriate and
concordant with the appearance of the sulci. There is no evidence for acute
intraparenchymal hemorrhage or infarct. And there is periventricular
low-attenuation indicative of small vessel disease. There is neither mass nor
mass effect. There is no shift of midline structures. The paranasal sinuses
and mastoid air cells are clear. There are no osseous lesions.
 
The cervical vertebra are in normal alignment. There is multilevel disc
height loss within the lower cervical spine. Intact anterior cervical spine
fusion hardware extending from C6 to C7 is identified.. There are no
fractures. There is no prevertebral soft tissue swelling.
 
There is no cervical lymphadenopathy.
 
Manifestations of emphysema are present within the lung apices. The
visualized lung apices are otherwise clear.
 
IMPRESSION:
No evidence for acute intracranial injury. Stable manifestations of cerebral
atrophy. No evidence for acute injury to the cervical spine. Age-appropriate
degenerative change to the cervical spine with intact hardware.
 
Manifestations of emphysema identified within the visualized lung apices.
 
DICTATED BY: Lux Avalos MD 
DATE/TIME DICTATED:07/18/18 / 1510
:RAD.DHALIWAL 
DATE/TIME TRANSCRIBED:18
 
(Grzegorz Almeida)
 
Departure
 
Departure
Disposition: HOME OR SELF CARE
Condition: Stable
Clinical Impression
Primary Impression: Fall
Secondary Impressions: Skin abrasion
Referrals:
Ronen TOSCANO,Ye Cash (PCP/Family)
 
Additional Instructions:
As discussed if you note signs infection return to emergency room 
Follow-up with primary care doctor if symptoms worsen return to emergency room
Apply bacitracin with bandages for the following 4 days
Departure Forms:
Customer Survey
General Discharge Information
(Grzegorz Almeida)
 
PA/NP Co-Sign Statement
Statement:
ED Attending supervision documentation-
 
[] I saw and evaluated the patient. I have also reviewed all the pertinent lab 
results and diagnostic results. I agree with the findings and the plan of care 
as documented in the PA's/NP's documentation. 
 
[x] I have reviewed the ED Record and agree with the PA's/NP's documentation.
 
[] Additions or exceptions (if any) to the PAs/NP's note and plan are 
summarized below:
[]
 
(Justin Erickson DO)

## 2018-07-25 ENCOUNTER — HOSPITAL ENCOUNTER (EMERGENCY)
Dept: HOSPITAL 68 - ERH | Age: 83
LOS: 1 days | End: 2018-07-26
Payer: COMMERCIAL

## 2018-07-25 VITALS — WEIGHT: 160 LBS | HEIGHT: 69 IN | BODY MASS INDEX: 23.7 KG/M2

## 2018-07-25 DIAGNOSIS — J90: ICD-10-CM

## 2018-07-25 DIAGNOSIS — E11.9: ICD-10-CM

## 2018-07-25 DIAGNOSIS — R41.82: Primary | ICD-10-CM

## 2018-07-25 DIAGNOSIS — F03.90: ICD-10-CM

## 2018-07-25 DIAGNOSIS — I10: ICD-10-CM

## 2018-07-25 LAB
ABSOLUTE GRANULOCYTE CT: 6.1 /CUMM (ref 1.4–6.5)
BASOPHILS # BLD: 0.1 /CUMM (ref 0–0.2)
BASOPHILS NFR BLD: 0.7 % (ref 0–2)
EOSINOPHIL # BLD: 0.2 /CUMM (ref 0–0.7)
EOSINOPHIL NFR BLD: 1.9 % (ref 0–5)
ERYTHROCYTE [DISTWIDTH] IN BLOOD BY AUTOMATED COUNT: 20.1 % (ref 11.5–14.5)
GRANULOCYTES NFR BLD: 60.8 % (ref 42.2–75.2)
HCT VFR BLD CALC: 43.8 % (ref 42–52)
LYMPHOCYTES # BLD: 3.3 /CUMM (ref 1.2–3.4)
MCH RBC QN AUTO: 27.5 PG (ref 27–31)
MCHC RBC AUTO-ENTMCNC: 32.5 G/DL (ref 33–37)
MCV RBC AUTO: 84.7 FL (ref 80–94)
MONOCYTES # BLD: 0.3 /CUMM (ref 0.1–0.6)
PLATELET # BLD: 218 /CUMM (ref 130–400)
PMV BLD AUTO: 10.4 FL (ref 7.4–10.4)
RED BLOOD CELL CT: 5.18 /CUMM (ref 4.7–6.1)
WBC # BLD AUTO: 10 /CUMM (ref 4.8–10.8)

## 2018-07-25 NOTE — RADIOLOGY REPORT
EXAMINATION:
XR PORTABLE CHEST
 
CLINICAL INFORMATION:
Assess for pneumonia
 
Acute mental status change
 
COMPARISON:
CT scan 4/13/2018
 
TECHNIQUE:
Portable frontal view of the chest was obtained.
 
FINDINGS:
There is a small left-sided pleural effusion. No focal infiltration is
measurable. Right lung grossly clear. Heart and mediastinal normal.
 
IMPRESSION:
Small nonspecific left-sided pleural effusion.

## 2018-07-25 NOTE — CT SCAN REPORT
EXAMINATION:
CT HEAD WITHOUT CONTRAST
 
CLINICAL INFORMATION:
Altered mental status. Fall.
 
COMPARISON:
CT head 07/18/2018
 
TECHNIQUE:
Contiguous axial imaging was performed from the skull base to vertex without
intravenous administration of contrast.
 
DLP:
1241.29 mGy-cm
 
FINDINGS:
There is no evidence of acute intracranial hemorrhage or territorial
infarction. No abnormal mass effect or midline shift is seen. Gray to white
matter differentiation is well preserved. No extra-axial fluid collections
are identified.
 
There is atrophy with prominence of the ventricles and the sulci and
hypodensity of the periventricular white matter due to chronic small vessel
ischemic disease. There is vascular calcifications of the internal carotid
arteries bilaterally.
 
The osseous structures and soft tissues are normal. The mastoid air cells and
visualized portions of the paranasal sinuses are well aerated.
 
IMPRESSION:
No acute intracranial pathology.

## 2018-07-25 NOTE — ED GENERAL ADULT
History of Present Illness
 
General
Chief Complaint: General Adult
Stated Complaint: BIBA ?UTI
Source: patient, old records
Exam Limitations: dementia, poor historian
 
Vital Signs & Intake/Output
Vital Signs & Intake/Output
 Vital Signs
 
 
Date Time Temp Pulse Resp B/P B/P Pulse O2 O2 Flow FiO2
 
     Mean Ox Delivery Rate 
 
 2144  61 18 140/81  97 Room Air  
 
 2030      96 Room Air  
 
 1900 97.8 55 18 132/77  96 Room Air  
 
 
 ED Intake and Output
 
 
  0000  1200
 
Intake Total 1000 
 
Output Total  
 
Balance 1000 
 
   
 
Intake, IV 1000 
 
Patient 160 lb 
 
Weight  
 
Weight Estimated 
 
Measurement  
 
Method  
 
 
 
Allergies
Coded Allergies:
NO KNOWN ALLERGIES (16)
 
Reconcile Medications
Alprazolam 0.5 MG TABLET   1 TAB PO Q6H PRN AGITATION  (Reported)
Amlodipine Besylate 10 MG TABLET   1 TAB PO DAILY HEART  (Reported)
Aspirin (Ecotrin*) 81 MG TABLET.DR   1 TAB PO DAILY HEART  (Reported)
Cholecalciferol (Vitamin D3) (Vitamin D) 1,000 UNIT TABLET   1 TAB PO DAILY SUP 
(Reported)
Citalopram Hydrobromide (Celexa) 10 MG TABLET   0.5 TAB PO DAILY MENTAL HEALTH  
(Reported)
Cyanocobalamin (Vitamin B-12) 1,000 MCG TABLET   1 TAB PO DAILY SUP  (Reported)
Divalproex Sodium (Depakote) 125 MG TABLET.DR   1 TAB PO TID MOOD  (Reported)
Donepezil HCl 10 MG TABLET   1 TAB PO DAILY DEMENTIA  (Reported)
Emollient Combination No.69 (Eucerin Skin Calming) 226 GM CREAM..G.   1 CRISPIN TOP 
AD PRN DRY SKIN  (Reported)
Folic Acid 1 MG TABLET   1 TAB PO DAILY VITAMIN SUPPORT  (Reported)
Loperamide HCl (Loperamide) 2 MG CAPSULE   1 CAP PO 4XDP PRN DIARRHEA  (Reported
)
Ramelteon (Rozerem) 8 MG TABLET   1 TAB PO AT BEDTIME PRN INSOMNIA
Simvastatin (Zocor*) 40 MG TABLET   1 TAB PO QHS CHOLESTEROL  (Reported)
Trazodone HCl 50 MG TABLET   0.5 TAB PO Q8 PRN AGITATION/AGGRESSIVE BEHAVIOIU
Trimethoprim 100 MG TABLET   1 TAB PO DAILY UNKNOWN  (Reported)
 
Triage Note:
PT COMING FROM NURSING HOME. PT CONCERNED FOR
HAVING UTI. PER FACILITY PT HAS HAD INCREASED
AGITATION AND CONFUSION OVER LAST FEW DAYS.
DECREASED OUTPUT. PER EMS FACILITY TRIED TO
STRAIGHT CATH PT AND COULD NOT GET ANY URINE OUT.
HX DEMENTIA
Triage Nurses Notes Reviewed? yes
Onset: Gradual
Injury Environment: home
HPI:
83-year-old male with history of dementia, hypertension, diabetes, anemia 
brought in by ambulance from nursing home for possible UTI.  HPI is limited due 
to patient's dementia.  Patient states he is unsure why he was here, he feels in
his usual state of health, no symptoms.  Per facility patient has had increasing
confusion and agitation for a few days with diminished urinary output.  Facility
attempted straight cath which was unsuccessful.
(Marcia Hernadez)
 
Past History
 
Travel History
Traveled to [place] past 21 day No
 
Medical History
Any Pertinent Medical History? see below for history
Neurological: dementia
EENT: NONE
Cardiovascular: hypertension, HYPERLIPIDEMIA
Respiratory: NONE
Gastrointestinal: NONE
Hepatic: NONE
Renal: NONE
Musculoskeletal: NONE
Psychiatric: NONE
Endocrine: niddm
Blood Disorders: anemia
Cancer(s): NONE
GYN/Reproductive: NONE
History of MRSA: No
History of VRE: No
History of CDIFF: No
Tetanus Vaccine: 18
 
Surgical History
Surgical History: non-contributory
 
Psychosocial History
Who do you live with Family
[facility]
What is your primary language English
 
Family History
Family History, If Any:
Relation not specified for:
  *No pertinent family history
 
Hx Contributory? No
(Marcia Hernadez)
 
Review of Systems
 
Review of Systems
Constitutional:
Reports: see HPI. 
EENTM:
Reports: no symptoms. 
Respiratory:
Reports: no symptoms. 
Cardiovascular:
Reports: no symptoms. 
GI:
Reports: no symptoms. 
Genitourinary:
Reports: see HPI. 
Musculoskeletal:
Reports: no symptoms. 
Skin:
Reports: no symptoms. 
Neurological/Psychological:
Reports: see HPI. 
Hematologic/Endocrine:
Reports: no symptoms. 
Immunologic/Allergic:
Reports: no symptoms. 
All Other Systems: Reviewed and Negative
(Marcia Hernadez)
 
Physical Exam
 
Physical Exam
General Appearance: well developed/nourished, no apparent distress, alert, awake
Head: atraumatic, normal appearance
Eyes:
Bilateral: PERRL, EOMI, other (pinpoint). 
Ears, Nose, Throat: normal pharynx, hearing grossly normal
Neck: normal inspection, supple, full range of motion
Respiratory: normal breath sounds, no respiratory distress, lungs clear
Cardiovascular: regular rate/rhythm
Gastrointestinal: normal bowel sounds, soft, non-tender, no organomegaly
Back: normal inspection, normal range of motion
Extremities: +edema of right upper extremity +MILD edema of right lower 
extremity
Neurologic/Psych: awake, alert, CNs II-XII nml as tested, oriented to person, 
place, current events, thought the year was 
Skin: intact, normal color, warm/dry
 
Core Measures
ACS in differential dx? Yes
CVA/TIA Diagnosis: No
Sepsis Present: No
Sepsis Focused Exam Completed? No
(Margaret ADAMES,Marcia Randhawa)
 
Progress
Differential Diagnoses
I considered the following diagnoses in my evaluation of the patient: [Dementia,
sepsis, UTI, pneumonia, ICH, electrolyte abnormality, anemia, arrhythmia]
 
Plan of Care:
 Orders
 
 
Procedure Date/time Status
 
Add-on Test (ER Only) 2240 Active
 
URINALYSIS 2210 Complete
 
LACTIC ACID 2206 Complete
 
URINE DRUG SCREEN FOR ER ONLY  1950 Complete
 
CULTURE,URINE 1906 Active
 
BLOOD CULTURE 1906 Active
 
TROPONIN LEVEL 1906 Complete
 
LACTIC ACID 1906 Complete
 
COMPREHENSIVE METABOLIC PANEL 1906 Complete
 
CBC WITHOUT DIFFERENTIAL 1906 Complete
 
EKG 1906 Active
 
 
 Laboratory Tests
 
 
 
18 2238:
Lactic Acid 2.0
 
18:
Urine Opiates Screen < 100, Methadone Screen 53, Barbiturate Screen < 60, Ur 
Phencyclidine Scrn < 6.00, Amphetamines Screen 301, U Benzodiazepines Scrn < 85,
Urine Cocaine Screen < 50, Urine Cannabis Screen < 5.00, Urine Color YEL, Urine 
Clarity CLEAR, Urine pH 6.0, Ur Specific Gravity 1.020, Urine Protein NEG, Urine
Ketones TRACE  H, Urine Nitrite NEG, Urine Bilirubin NEG, Urine Urobilinogen 0.2
, Ur Leukocyte Esterase NEG, Ur Microscopic SEDIMENT EXAMINED, Urine RBC RARE, 
Urine WBC RARE, Ur Epithelial Cells RARE, Urine Bacteria RARE  H, Urine 
Hemoglobin TRACE-INTACT  H, Urine Glucose NEG
 
18:
Anion Gap 14, Estimated GFR > 60, BUN/Creatinine Ratio 18.2, Glucose 98, Lactic 
Acid 2.1, Calcium 9.5, Total Bilirubin 0.5, AST 19, ALT 28, Alkaline Phosphatase
81, Troponin I < 0.01, Total Protein 6.6, Albumin 3.8, Globulin 2.8, Albumin/
Globulin Ratio 1.4, CBC w Diff NO MAN DIFF REQ, RBC 5.18, MCV 84.7, MCH 27.5, 
MCHC 32.5  L, RDW 20.1  H, MPV 10.4, Gran % 60.8, Lymphocytes % 33.3, Monocytes 
% 3.3, Eosinophils % 1.9, Basophils % 0.7, Absolute Granulocytes 6.1, Absolute 
Lymphocytes 3.3, Absolute Monocytes 0.3, Absolute Eosinophils 0.2, Absolute 
Basophils 0.1
 Microbiology
2210  URINE ROUT: Urine Culture - RECD
1926  BLOOD: Blood Culture - RECD
1915  BLOOD: Blood Culture - RECD
 
CXR shows small nonspecific left sided pleural effusion.
 
Old records reviewed, patient had a recent fall, at which time he had a normal 
head CT scan.  We'll obtain repeat CT scan for further evaluation given his 
current confusion/AMS.
 
And urine is pending, lactic acid is mildly elevated, IV fluids initiated.  CT 
scan is pending.
 
The patient was signed out to Dr. Bergman pending UA, Labs, CT scan.
Diagnostic Imaging:
Viewed by Me: Radiology Read.  Discussed w/RAD: Radiology Read. 
CXR Impression: PATIENT: FABRIZIO CM  MEDICAL RECORD NO: [id number] PRESENT AGE: 
83  PATIENT ACCOUNT NO: [id number] : 34  LOCATION: [facility] ORDERING PHYSICIAN:
Marcia ADAMES     SERVICE DATE:  EXAM TYPE: RAD - XRY-
PORTABLE CHEST XRAY EXAMINATION: XR PORTABLE CHEST CLINICAL INFORMATION: Assess 
for pneumonia Acute mental status change COMPARISON: CT scan 2018 TECHNIQUE
: Portable frontal view of the chest was obtained. FINDINGS: There is a small 
left-sided pleural effusion. No focal infiltration is measurable. Right lung 
grossly clear. Heart and mediastinal normal. IMPRESSION: Small nonspecific left-
sided pleural effusion. DICTATED BY: Ugo Mata MD  DATE/TIME DICTATED:18 :RAD.DHALIWAL  DATE/TIME TRANSCRIBED:18 
CONFIDENTIAL, DO NOT COPY WITHOUT APPROPRIATE AUTHORIZATION.  <Electronically 
signed in Other Vendor System>                                                  
                                     SIGNED BY: Ugo Mata MD 18
Initial ED EKG: sinus bradycardia @54bpm, artifact present
Prior EKG: changed (4/15/18)
Hand-Off
   Endorsed To:
Husam Bergman MD
   Endorsed Time: 
   Pending: CT, labs
(Marcia Hernadez)
Differential Diagnoses
I considered the following diagnoses in my evaluation of the patient: 
 
(Husam Bergman MD)
 
Departure
 
Departure
Condition: Stable
Referrals:
Ronen TOSCANO,Ye Cash (PCP/Family)
 
Departure Forms:
Customer Survey
General Discharge Information
(Marcia Hernadez)
 
Departure
Time of Disposition: 12
Disposition: ACUTE REHAB FACILITY
Clinical Impression
Primary Impression: Altered mental status
Secondary Impressions: Dementia, Pleural effusion
 
PA/NP Co-Sign Statement
Statement:
ED Attending supervision documentation-
 
x I saw and evaluated the patient. I have also reviewed all the pertinent lab 
results and diagnostic results. I agree with the findings and the plan of care 
as documented in the PA's/NP's documentation. 
 
[] I have reviewed the ED Record and agree with the PA's/NP's documentation.
 
[] Additions or exceptions (if any) to the PAs/NP's note and plan are 
summarized below:
[]
 
(Husam Bergman MD)
 
Critical Care Note
 
Critical Care Note
Critical Care Time: non-applicable
(Marcia Hernadez)

## 2018-07-26 VITALS — DIASTOLIC BLOOD PRESSURE: 82 MMHG | SYSTOLIC BLOOD PRESSURE: 133 MMHG

## 2018-08-18 ENCOUNTER — HOSPITAL ENCOUNTER (EMERGENCY)
Dept: HOSPITAL 68 - ERH | Age: 83
End: 2018-08-18
Payer: COMMERCIAL

## 2018-08-18 VITALS — HEIGHT: 71 IN | BODY MASS INDEX: 24.78 KG/M2 | WEIGHT: 177 LBS

## 2018-08-18 VITALS — SYSTOLIC BLOOD PRESSURE: 108 MMHG | DIASTOLIC BLOOD PRESSURE: 64 MMHG
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
## 2018-08-18 DIAGNOSIS — S09.90XA: Primary | ICD-10-CM
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
## 2018-08-18 LAB
ABSOLUTE GRANULOCYTE CT: 5.8 /CUMM (ref 1.4–6.5)
BASOPHILS # BLD: 0 /CUMM (ref 0–0.2)
BASOPHILS NFR BLD: 0.1 % (ref 0–2)
EOSINOPHIL # BLD: 0 /CUMM (ref 0–0.7)
EOSINOPHIL NFR BLD: 0.5 % (ref 0–5)
ERYTHROCYTE [DISTWIDTH] IN BLOOD BY AUTOMATED COUNT: 21.6 % (ref 11.5–14.5)
GRANULOCYTES NFR BLD: 66.8 % (ref 42.2–75.2)
HCT VFR BLD CALC: 37.3 % (ref 42–52)
LYMPHOCYTES # BLD: 2.3 /CUMM (ref 1.2–3.4)
MCH RBC QN AUTO: 27.4 PG (ref 27–31)
MCHC RBC AUTO-ENTMCNC: 32.5 G/DL (ref 33–37)
MCV RBC AUTO: 84.4 FL (ref 80–94)
MONOCYTES # BLD: 0.5 /CUMM (ref 0.1–0.6)
PLATELET # BLD: 328 /CUMM (ref 130–400)
PMV BLD AUTO: 8.5 FL (ref 7.4–10.4)
RED BLOOD CELL CT: 4.41 /CUMM (ref 4.7–6.1)
WBC # BLD AUTO: 8.6 /CUMM (ref 4.8–10.8)

## 2018-08-18 NOTE — CT SCAN REPORT
CT HEAD WITHOUT CONTRAST
 
CLINICAL INFORMATION:
Head injury.
 
COMPARISON:
Head CT 07/25/2018.
 
TECHNIQUE:
Contiguous axial imaging was performed from the skull base to vertex without
intravenous administration of contrast.
 
FINDINGS:
Artifactually motion degraded study with no definite acute findings
accounting for artifact. Global cerebral volume loss and moderate chronic
microangiopathy. There is no intracranial hemorrhage, hydrocephalus,
extra-axial surface collection, midline shift, or other herniation pattern.
Gray to white matter differentiation is diffusely maintained without evidence
of an evolved acute territorial infarct. The basilar cisterns are preserved.
No significant soft tissue abnormality. No acute osseous abnormality. The
paranasal sinuses and the mastoid air cells are well-aerated.
 
IMPRESSION:
- Artifactually motion degraded study with no definite acute findings
accounting for artifact.
 
- Global cerebral volume loss and moderate chronic microangiopathy.

## 2018-08-18 NOTE — ED GENERAL ADULT
History of Present Illness
 
General
Chief Complaint: Fall
Stated Complaint: FALL
Source: EMS
Exam Limitations: dementia
 
Vital Signs & Intake/Output
Vital Signs & Intake/Output
 Vital Signs
 
 
Date Time Temp Pulse Resp B/P B/P Pulse O2 O2 Flow FiO2
 
     Mean Ox Delivery Rate 
 
08/18 1729 96.5 74 20 113/58  97 Room Air  
 
 
 
Allergies
Coded Allergies:
NO KNOWN ALLERGIES (01/29/16)
 
Reconcile Medications
Alprazolam 0.5 MG TABLET   1 TAB PO Q6H PRN AGITATION  (Reported)
Amlodipine Besylate 10 MG TABLET   1 TAB PO DAILY HEART  (Reported)
Aspirin (Ecotrin*) 81 MG TABLET.DR   1 TAB PO DAILY HEART  (Reported)
Cholecalciferol (Vitamin D3) (Vitamin D) 1,000 UNIT TABLET   1 TAB PO DAILY SUP 
(Reported)
Citalopram Hydrobromide (Celexa) 10 MG TABLET   0.5 TAB PO DAILY MENTAL HEALTH  
(Reported)
Cyanocobalamin (Vitamin B-12) 1,000 MCG TABLET   1 TAB PO DAILY SUP  (Reported)
Divalproex Sodium (Depakote) 125 MG TABLET.DR   1 TAB PO TID MOOD  (Reported)
Donepezil HCl 10 MG TABLET   1 TAB PO DAILY DEMENTIA  (Reported)
Emollient Combination No.69 (Eucerin Skin Calming) 226 GM CREAM..G.   1 CRISPIN TOP 
AD PRN DRY SKIN  (Reported)
Folic Acid 1 MG TABLET   1 TAB PO DAILY VITAMIN SUPPORT  (Reported)
Loperamide HCl (Loperamide) 2 MG CAPSULE   1 CAP PO 4XDP PRN DIARRHEA  (Reported
)
Ramelteon (Rozerem) 8 MG TABLET   1 TAB PO AT BEDTIME PRN INSOMNIA
Simvastatin (Zocor*) 40 MG TABLET   1 TAB PO QHS CHOLESTEROL  (Reported)
Trazodone HCl 50 MG TABLET   0.5 TAB PO Q8 PRN AGITATION/AGGRESSIVE BEHAVIOIU
Trimethoprim 100 MG TABLET   1 TAB PO DAILY UNKNOWN  (Reported)
 
Triage Note:
84 YEAR OLD MALE HISTORY OF DEMENTIA TO ER VIA
 AMBULANCE FROM LOCAL [facility], PT ALERT BUT CONFUSED AT
 BASELINE, NON AMULATORY AT BASELINE, PER EMS THEY
 WERE CALLED TO [facility] AT AROUND 1400 FOR A LIFT
 ASSIST DUE TO PT FELL OUT OF HIS WHEELCHAIR WITH
 NO INJURIES, PTS FAMILY WAS THERE AT TIME AND DID
 NOT WANT HIM TRANSPORTED TO ER. EMS WAS CALLED
 AGAIN DUE TO PT WAS LEANING FORWARD IN HIS
 WHEELCHAIR AND THEY WERE AFRAID HE WAS GOING TO
 FALL AGAIN. STAFF CONCERNED DUE TO PT LEANING
 FORWARD AND WANTED HIM CHECKED OUT. PT OFFERS NO
 COMPLAINTS.
Triage Nurses Notes Reviewed? yes
HPI:
84-year-old male with history of dementia brought in by EMS for fall.  The 
patient fell in the daytime with family.  He hit his head on the grass.  The did
not want transport to the hospital so the patient was not sent to the hospital. 
Eventually the patient was alone in the nursing home.  He was weak on the dinner
table so he was sent in for evaluation.  The patient has no complaints.  The 
patient has dementia and does not remember the events.  History given by EMS.  
Review of system limited.
 
Past History
 
Travel History
Traveled to [facility] past 21 day No
 
Medical History
Any Pertinent Medical History? none
Neurological: dementia
EENT: NONE
Cardiovascular: hypertension, HYPERLIPIDEMIA
Respiratory: NONE
Gastrointestinal: NONE
Hepatic: NONE
Renal: NONE
Musculoskeletal: NONE
Psychiatric: NONE
Endocrine: niddm
Blood Disorders: anemia
Cancer(s): NONE
GYN/Reproductive: NONE
History of MRSA: No
History of VRE: No
History of CDIFF: No
Tetanus Vaccine: 07/18/18
 
Surgical History
Surgical History: non-contributory
 
Psychosocial History
Who do you live with Family
[facility] at Home Home Health Aide
What is your primary language English
Tobacco Use: Never used
ETOH Use: denies use
Illicit Drug Use: denies illicit drug use
 
Family History
Family History, If Any:
Relation not specified for:
  *No pertinent family history
 
Hx Contributory? No
 
Review of Systems
 
Review of Systems
Constitutional:
Denies: chills, diaphoresis, fever. 
EENTM:
Denies: blurred vision, visual changes. 
Respiratory:
Denies: short of breath. 
Cardiovascular:
Denies: chest pain, edema. 
GI:
Denies: abdominal pain, diarrhea, nausea. 
Genitourinary:
Denies: dysuria. 
Musculoskeletal:
Denies: back pain. 
Skin:
Denies: cysts, change in skin color, change in hair/nails. 
Neurological/Psychological:
Denies: headache. 
Comments
Limited review of system, the patient denies everything.
 
Physical Exam
 
Physical Exam
General Appearance: awake
Head: atraumatic, normal appearance
Eyes:
Bilateral: PERRL, EOMI. 
Ears, Nose, Throat: normal pharynx, normal ENT inspection
Neck: normal inspection, supple
Respiratory: normal breath sounds, chest non-tender
Cardiovascular: regular rate/rhythm
Gastrointestinal: normal bowel sounds, soft, non-tender
Back: normal inspection
Neurologic/Psych: awake, alert
Skin: intact, normal color
Comments:
No obvious traumatic injury seen.  Patient is nontender.
 
Core Measures
ACS in differential dx? No
CVA/TIA Diagnosis: No
Sepsis Present: No
Sepsis Focused Exam Completed? No
 
Progress
Differential Diagnoses
.
Plan of Care:
 Orders
 
 
Procedure Date/time Status
 
Add-on Test (ER Only) 08/18 1751 Active
 
EKG 08/18 1751 Active
 
CT HEAD WO IV CONTRAST 08/18 1751 Active
 
URINALYSIS 08/18 1729 Complete
 
TROPONIN LEVEL 08/18 1729 Complete
 
COMPREHENSIVE METABOLIC PANEL 08/18 1729 Complete
 
CBC WITHOUT DIFFERENTIAL 08/18 1729 Complete
 
 
 Laboratory Tests
 
 
08/18/18 1821:
Urine Color YEL, Urine Clarity CLEAR, Urine pH 6.0, Ur Specific Gravity 1.020, 
Urine Protein NEG, Urine Ketones NEG, Urine Nitrite NEG, Urine Bilirubin NEG, 
Urine Urobilinogen 0.2, Ur Leukocyte Esterase NEG, Ur Microscopic EXAM NOT 
REQUIRED, Urine Hemoglobin NEG, Urine Glucose NEG
 
08/18/18 1729:
Anion Gap 13, Estimated GFR 41  L, BUN/Creatinine Ratio 8.1, Glucose 207  H, 
Calcium 9.2, Total Bilirubin 0.4, AST 12  L, ALT 18  L, Alkaline Phosphatase 76,
Troponin I < 0.01, Total Protein 6.0  L, Albumin 3.3  L, Globulin 2.7, Albumin/
Globulin Ratio 1.2, CBC w Diff NO MAN DIFF REQ, RBC 4.41  L, MCV 84.4, MCH 27.4,
MCHC 32.5  L, RDW 21.6  H, MPV 8.5, Gran % 66.8, Lymphocytes % 26.3, Monocytes %
6.3, Eosinophils % 0.5, Basophils % 0.1, Absolute Granulocytes 5.8, Absolute 
Lymphocytes 2.3, Absolute Monocytes 0.5, Absolute Eosinophils 0, Absolute 
Basophils 0
 
Initial ED EKG: none
Comments:
Nonspecific complains of weakness.  We will do medical evaluation.  No traumatic
injury seen.  Head CT will be ordered.
 
Departure
 
Departure
Time of Disposition: 1909
Disposition: STILL A PATIENT
Condition: Stable
Clinical Impression
Primary Impression: Fall
Referrals:
Ronen TOSCANO,Ye Cash (PCP/Family)
 
Departure Forms:
Customer Survey
General Discharge Information
Comments
s/o Dr. Bergman
 
Critical Care Note
 
Critical Care Note
Critical Care Time: non-applicable

## 2025-01-14 NOTE — PN- CARDIOLOGY
Subjective
Subjective:
 
Patient is a limited historian but is resting comfortably and offers no 
complaints.
 
Objective
Vital Signs and I&Os
Vital Signs
 
 
Date Time Temp Pulse Resp B/P B/P Pulse O2 O2 Flow FiO2
 
     Mean Ox Delivery Rate 
 
04/13 1456 97.6 68 18 112/62  97   
 
04/13 0602 97.2 59 20 102/58  98 Room Air  
 
04/12 2252 97.5 55 20 100/56  97 Room Air  
 
04/12 1643 97.6 59 20 98/54  98 Room Air  
 
04/12 1600       Room Air  
 
 
 Intake & Output
 
 
 04/13 1600 04/13 0800 04/13 0000 04/12 1600 04/12 0800 04/12 0000
 
Intake Total 500 110 550   
 
Output Total      
 
Balance 500 110 550   
 
       
 
Intake, IV  10    
 
Intake, Oral 500 100 550   
 
Patient   166 lb 177 lb  
 
Weight      
 
Weight   Bed scale Reported by Patient  
 
Measurement      
 
Method      
 
 
 
Physical Exam:
 
General: no apparent distress. 
Eyes: No obvious scleral icterus.
HEENT: No jugular venous distention or abnormal jugular venous pulsations.
Cardiovascular: Normal intensity S1/S2.  Regular
Respiratory: Lungs clear to auscultation bilaterally.
Abdomen: Soft, nontender with no guarding or rebound tenderness.
Musculoskeletal: No clubbing or cyanosis noted, no edema
Skin: Warm
 
Current Medications:
 Current Medications
 
 
  Sig/Tanesha Start time  Last
 
Medication Dose Route Stop Time Status Admin
 
Acetaminophen 650 MG Q6P PRN 04/11 2300 AC 
 
  PO   
 
Atorvastatin Calcium 20 MG 1700 04/12 1700 AC 04/12
 
  PO   1746
 
Cholecalciferol 1,000 IU 0900 04/12 0900 AC 04/13
 
  PO   0831
 
Cyanocobalamin 1,000 MCG 0900 04/12 0900 AC 04/13
 
  PO   0830
 
Divalproex Sodium 125 MG TID 04/12 0900 AC 04/13
 
  PO   1432
 
Donepezil HCl 10 MG 0900 04/12 0900 AC 04/13
 
  PO   0830
 
Ramelteon 8 MG AT BEDTIME PRN 04/12 0145 AC 
 
  PO   
 
Trazodone HCl 25 MG Q8 PRN 04/11 2345 AC 
 
  PO   
 
 
 
 
Results
Last 48 Hrs of Labs/Mics:
 Laboratory Tests
 
04/13/18 0610:
Anion Gap 12, Estimated GFR > 60, BUN/Creatinine Ratio 16.4, CBC w Diff NO MAN 
DIFF REQ, RBC 2.62  L, MCV 95.3  H, MCH 30.3, MCHC 31.8  L, RDW 22.1  H, MPV 
10.1, Gran % 59.5, Lymphocytes % 31.2, Monocytes % 7.4, Eosinophils % 1.4, 
Basophils % 0.5, Absolute Granulocytes 5.5, Absolute Lymphocytes 2.9, Absolute 
Monocytes 0.7  H, Absolute Eosinophils 0.1, Absolute Basophils 0
 
04/12/18 1845:
CBC w Diff NO MAN DIFF REQ, RBC 2.56  L, MCV 94.9  H, MCH 30.4, MCHC 32.0  L, 
RDW 23.6  H, MPV 10.0, Gran % 64.8, Lymphocytes % 27.8, Monocytes % 6.3, 
Eosinophils % 0.5, Basophils % 0.6, Absolute Granulocytes 5.9, Absolute 
Lymphocytes 2.5, Absolute Monocytes 0.6, Absolute Eosinophils 0, Absolute 
Basophils 0.1
 
04/12/18 0601:
Haptoglobin Pending
 
04/12/18 0601:
Anion Gap 11, Estimated GFR 58  L, BUN/Creatinine Ratio 20.0, Total Bilirubin 
0.4, Direct Bilirubin 0.4, Lactate Dehydrogenase 360, PT 10.8, INR 0.99, APTT 27
, CBC w Diff NO MAN DIFF REQ, RBC 2.43  L, MCV 94.0, MCH 30.3, MCHC 32.2  L, RDW
21.8  H, MPV 10.0, Gran % 58.5, Lymphocytes % 33.0, Monocytes % 6.7, Eosinophils
% 1.3, Basophils % 0.5, Absolute Granulocytes 4.5, Absolute Lymphocytes 2.5, 
Absolute Monocytes 0.5, Absolute Eosinophils 0.1, Absolute Basophils 0, Retic 
Count 10.12  H
 
04/11/18 2133:
CBC w Diff NO MAN DIFF REQ, RBC 2.52  L, MCV 94.9  H, MCH 30.6, MCHC 32.2  L, 
RDW 23.4  H, MPV 9.0, Gran % 66.7, Lymphocytes % 25.5, Monocytes % 6.7, 
Eosinophils % 0.5, Basophils % 0.6, Absolute Granulocytes 6.5, Absolute 
Lymphocytes 2.5, Absolute Monocytes 0.7  H, Absolute Eosinophils 0.1, Absolute 
Basophils 0.1, Retic Count 9.52  H
 
04/11/18 1629:
Anion Gap 14, Estimated GFR 53  L, BUN/Creatinine Ratio 22.3, Glucose 115  H, 
Calcium 9.1, Iron 54, TIBC 357, Ferritin 223.0, Total Bilirubin 0.4, AST 16  L, 
ALT 20  L, Alkaline Phosphatase 64, Lactate Dehydrogenase 431, Total Protein 6.0
 L, Albumin 3.6, Globulin 2.4, Albumin/Globulin Ratio 1.5, Prealbumin 24.6, 
Vitamin B12 > 1000  H, Folate > 20.0  H, TSH &T3 &Free T4 Intrp 2.070, CBC w 
Diff NO MAN DIFF REQ, RBC 2.67  L, MCV 94.2  H, MCH 31.0, MCHC 32.9  L, RDW 23.1
 H, MPV 9.3, Gran % 66.4, Lymphocytes % 26.5, Monocytes % 5.8, Eosinophils % 0.6
, Basophils % 0.7, Absolute Granulocytes 7.9  H, Absolute Lymphocytes 3.2, 
Absolute Monocytes 0.7  H, Absolute Eosinophils 0.1, Absolute Basophils 0.1
 
Recent Imaging Studies:
 
Telemetry tracings were personally reviewed and showed atrial fibrillation which
converted to sinus rhythm
 
Assessment/Plan
Assessment/Plan
 
 
1.  Anemia unknown source 
2.  Atrial fibrillation of unknown duration with slow ventricular response; 
spontaneously converted to sinus rhythm
3.  Hypertension
4.  Diabetes mellitus
5.  Dementia
6.  Poor appetite/weight loss
 
 
Patient is a limited historian but is resting comfortably and offers no 
complains.  He spontaneously converted to sinus rhythm.  Per the medical record 
his family prefers conservative approach and declined colonoscopy.  Plan is to 
not initiate anticoagulation at this time given that he is back in sinus rhythm 
and given his dementia, anemia and plan for conservative approach.  No 
additional inpatient cardiac workup is required.  He can follow-up in our office
after discharge.
 
 
Sam Cagle MD [facility]
 
Continue telemetry? No
Subjective
Subjective:
 
Patient remains a limited historian but is sitting up in bed and resting 
comfortably and offers no complaints.  I also discussed with his nurse and she 
tells me that he has not been complaining of anything.
 
Objective
Vital Signs and I&Os
Vital Signs
 
 
Date Time Temp Pulse Resp B/P B/P Pulse O2 O2 Flow FiO2
 
     Mean Ox Delivery Rate 
 
04/16 0600 98.6 61 18 104/60  93   
 
04/15 2321 98.9 72 14 110/76  93   
 
04/15 1458    112/70     
 
04/15 1426 98.0 77 18   97 Room Air  
 
 
 Intake & Output
 
 
 04/16 1600 04/16 0800 04/16 0000 04/15 1600 04/15 0800 04/15 0000
 
Intake Total    400  
 
Output Total      
 
Balance    400  
 
       
 
Intake, Oral    400  
 
Number      1
 
Bowel      
 
Movements      
 
Patient   166 lb   164 lb
 
Weight      
 
 
 
Physical Exam:
 
General: no apparent distress. 
Eyes: No obvious scleral icterus.
HEENT: No jugular venous distention or abnormal jugular venous pulsations.
Cardiovascular: Normal intensity S1/S2.  Regular
Respiratory: Lungs clear to auscultation bilaterally.
Abdomen: Soft, nontender with no guarding or rebound tenderness.
Musculoskeletal: No clubbing or cyanosis noted, no edema
Skin: Warm
Current Medications:
 Current Medications
 
 
  Sig/Tanesha Start time  Last
 
Medication Dose Route Stop Time Status Admin
 
Acetaminophen 650 MG Q6P PRN 04/11 2300 AC 
 
  PO   
 
Atorvastatin Calcium 20 MG 1700 04/12 1700 AC 04/15
 
  PO   1602
 
Cholecalciferol 1,000 IU 0900 04/12 0900 AC 04/16
 
  PO   0800
 
Cyanocobalamin 1,000 MCG 0900 04/12 0900 AC 04/16
 
  PO   0800
 
Divalproex Sodium 125 MG TID 04/12 0900 AC 04/16
 
  PO   1243
 
Donepezil HCl 10 MG 0900 04/12 0900 AC 04/16
 
  PO   0800
 
Ramelteon 8 MG AT BEDTIME PRN 04/12 0145 AC 
 
  PO   
 
Trazodone HCl 25 MG Q8 PRN 04/11 2345 AC 
 
  PO   
 
 
 
 
Results
Last 48 Hrs of Labs/Mics:
 Laboratory Tests
 
04/16/18 0615:
Anion Gap 13, Estimated GFR > 60, BUN/Creatinine Ratio 24.5, Magnesium 2.0, CBC 
w Diff NO MAN DIFF REQ, RBC 2.72  L, MCV 95.7  H, MCH 30.1, MCHC 31.5  L, RDW 
21.4  H, MPV 10.2, Gran % 64.1, Lymphocytes % 27.3, Monocytes % 7.2, Eosinophils
% 0.8, Basophils % 0.6, Absolute Granulocytes 7.0  H, Absolute Lymphocytes 3.0, 
Absolute Monocytes 0.8  H, Absolute Eosinophils 0.1, Absolute Basophils 0.1
 
04/15/18 0700:
Anion Gap 10, Estimated GFR > 60, BUN/Creatinine Ratio 15.5, CBC w Diff NO MAN 
DIFF REQ, RBC 2.75  L, MCV 94.8  H, MCH 30.1, MCHC 31.8  L, RDW 21.6  H, MPV 
10.4, Gran % 60.8, Lymphocytes % 31.6, Monocytes % 5.8, Eosinophils % 1.4, 
Basophils % 0.4, Absolute Granulocytes 5.8, Absolute Lymphocytes 3.0, Absolute 
Monocytes 0.5, Absolute Eosinophils 0.1, Absolute Basophils 0
 
04/14/18 1500:
Anion Gap 11, Estimated GFR > 60, BUN/Creatinine Ratio 17.3, Magnesium 2.1
 
Recent Imaging Studies:
 
Telemetry tracings were personally reviewed and shows sinus rhythm with 
intermittent episodes of atrial fibrillation/flutter with aberrant conduction 
but without persistent episodes of sustained tachycardia
 
Assessment/Plan
Assessment/Plan
 
1.  Anemia unknown source 
2.  Atrial fibrillation of unknown duration with slow ventricular response; 
spontaneously converted to sinus rhythm; still with paroxysms with aberrant 
conduction, asymptomatic
3.  Hx of Hypertension
4.  Diabetes mellitus
5.  Dementia
6.  Poor appetite/weight loss
 
 
Patient appears comfortable and offers no complaints. Patient noted to have 
paroxysms of atrial fibrillation /flutter with aberrancy but no sustained 
tachycardia.  blood pressure unlikely to tolerate addition of beta-blocker.  Per
the primary team notes the family continues to prefer conservative approach and 
has declined colonoscopy in this anticoagulation was not initiated.  Further 
cardiac evaluation can be done as an outpatient; should follow up in our office 
within 1 week of discharge.
 
 
Sam Cagle MD [facility]
Continue telemetry? No
Subjective
Subjective:
No specific complaints, but history unreliable.
 
Objective
Vital Signs and I&Os
Vital Signs
 
 
Date Time Temp Pulse Resp B/P B/P Pulse O2 O2 Flow FiO2
 
     Mean Ox Delivery Rate 
 
04/15 1458    112/70     
 
04/15 1426 98.0 77 18   97 Room Air  
 
04/15 0600 98.0 55 20 108/50  94   
 
04/14 2300 97.7 86 20 102/58  95   
 
 
 Intake & Output
 
 
 04/15 1600 04/15 0800 04/15 0000 04/14 1600 04/14 0800 04/14 0000
 
Intake Total 400   400 50 210
 
Output Total      
 
Balance 400   400 50 210
 
       
 
Intake, IV      10
 
Intake, Oral 400   400 50 200
 
Number   1   1
 
Bowel      
 
Movements      
 
Patient   164 lb   166 lb
 
Weight      
 
 
 
Physical Exam:
Well-developed, morbidly obese elderly  male in no acute distress.
Vital signs: See above.
Neck: No JVD, no bruits.
Lungs: Poor inspiratory effort.
Heart: S1, S2 (irregularly, irregular).
Abdomen: Soft, nontender, positive bowel sounds.
Extremities: No edema.
 
Assessment/Plan
Assessment/Plan
[id number]-m w/ hx of dementia, HTN, HLD who presented to the  ED from his ECF (
[facility]) for low H&H discovered after blood work
performed after poor by mouth intake and weight loss observed and who, while 
hospitalized here, was discovered to go into atrial flutter significant 
ventricular ectopy versus aberrancy. 
 
Recommendations:
 
* Obtain 12-lead ECG when less artifact observed on monitor.
 
* Check magnesium and replete as indicated to maintain at or above 2.0.
 
* Given his dementia and multiple comorbidities will need to discuss with family
what they perceive as appropriate treatment goals regarding anticoagulation.  
 
* DVT prophylaxis.
Continue telemetry? Yes
Awake/Alert